# Patient Record
Sex: FEMALE | Race: ASIAN | NOT HISPANIC OR LATINO | Employment: UNEMPLOYED | URBAN - NONMETROPOLITAN AREA
[De-identification: names, ages, dates, MRNs, and addresses within clinical notes are randomized per-mention and may not be internally consistent; named-entity substitution may affect disease eponyms.]

---

## 2017-10-16 ENCOUNTER — TRANSCRIBE ORDERS (OUTPATIENT)
Dept: PHYSICAL THERAPY | Facility: HOSPITAL | Age: 72
End: 2017-10-16

## 2017-10-16 ENCOUNTER — HOSPITAL ENCOUNTER (OUTPATIENT)
Dept: PHYSICAL THERAPY | Facility: HOSPITAL | Age: 72
Setting detail: THERAPIES SERIES
Discharge: HOME | End: 2017-10-16

## 2017-10-16 DIAGNOSIS — M25.511 CHRONIC RIGHT SHOULDER PAIN: Primary | ICD-10-CM

## 2017-10-16 DIAGNOSIS — M25.511 RIGHT SHOULDER PAIN, UNSPECIFIED CHRONICITY: Primary | ICD-10-CM

## 2017-10-16 DIAGNOSIS — G89.29 CHRONIC RIGHT SHOULDER PAIN: Primary | ICD-10-CM

## 2017-10-16 PROCEDURE — 97161 PT EVAL LOW COMPLEX 20 MIN: CPT | Performed by: PHYSICAL THERAPIST

## 2017-10-16 PROCEDURE — G8985 CARRY GOAL STATUS: HCPCS | Performed by: PHYSICAL THERAPIST

## 2017-10-16 PROCEDURE — G8984 CARRY CURRENT STATUS: HCPCS | Performed by: PHYSICAL THERAPIST

## 2017-10-16 PROCEDURE — 97110 THERAPEUTIC EXERCISES: CPT | Performed by: PHYSICAL THERAPIST

## 2017-10-16 NOTE — THERAPY EVALUATION
Outpatient Physical Therapy Ortho Initial Evaluation  [facility]     Patient Name: Neal Roblero  : 1945  MRN: [id number]  Today's Date: 10/16/2017      Visit Date: 10/16/2017  Visit   Return to MD: CHRISTIAN  Re-cert date: 17    There is no problem list on file for this patient.       History reviewed. No pertinent past medical history.     History reviewed. No pertinent surgical history.    Visit Dx:     ICD-10-CM ICD-9-CM   1. Chronic right shoulder pain M25.511 719.41    G89.29 338.29                 PT Ortho       10/16/17 1000    Subjective Comments    Subjective Comments 70 yo female with 2 yr h/o chronic right shoulder pain, had been involved in much gardening at the time. Non-progressive R shoulder pain at this time.    -BS    Precautions and Contraindications    Precautions none  -BS    Subjective Pain    Able to rate subjective pain? yes  -BS    Pre-Treatment Pain Level 5  -BS    Post-Treatment Pain Level 2  -BS    Special Tests/Palpation    Special Tests/Palpation Shoulder  -BS    Shoulder Impingement/Rotator Cuff Special Tests    Espitia-Erick Test (RC Lesion vs. Bursitis) Right:;Positive  -BS    Empty Can Test (RC Lesion) Right:;Negative  -BS    Speed's Test (LH of Biceps Lesion) Right:;Negative  -BS    Biceps/Labral Special Tests    Geneva's Test (Labral Test) Right:;Negative  -BS    ROM (Range of Motion)    General ROM Detail AROM: R shoulder: flex 160° abd 130° ER 65° IR 70° L shoulder-flex/abd 170° ER 90° IR WNL  -BS    MMT (Manual Muscle Testing)    General MMT Assessment Detail R shoulder 3+/5 (all planes); L shoulder 4/5 (all planes)  -BS      User Key  (r) = Recorded By, (t) = Taken By, (c) = Cosigned By    Initials Name Provider Type    CARMEN Phillips, PT Physical Therapist                            Therapy Education       10/16/17 1200          Therapy Education    Given HEP;Symptoms/condition management;Pain management;Posture/body mechanics  -BS         User Key  (r) = Recorded By, (t) = Taken By, (c) = Cosigned By    Initials Name Provider Type    BS Brad Phillips, PT Physical Therapist                PT OP Goals       10/16/17 1200 10/16/17 1000    PT Short Term Goals    STG Date to Achieve 10/30/17  -BS     STG 1 Pt I with HEP  -BS     STG 1 Progress New  -BS     STG 2 Improve right shoulder MMT (all planes) to 4-/5  -BS     STG 2 Progress New  -BS     STG 3 Improve R shoulder abduction AROM to 150°  -BS     STG 3 Progress New  -BS     STG 4 Improve R shoulder IR (hand behind back) to T8 level  -BS     STG 4 Progress New  -BS     Long Term Goals    LTG Date to Achieve 11/13/17  -BS     LTG 1 Improve right shoulder MMT (all planes) to > or = 4/5  -BS     LTG 1 Progress New  -BS     LTG 2 Improve R shoulder IR (hand behind back) to T6 level  -BS     LTG 2 Progress New  -BS     LTG 3 Able to perform yardwork without residual R shoulder pain  -BS     LTG 3 Progress New  -BS     LTG 4 Able to perform ADL's using R UE (showering) without difficulty  -BS     LTG 4 Progress New  -BS     Time Calculation    PT Goal Re-Cert Due Date --  -BS 11/06/17  -BS      User Key  (r) = Recorded By, (t) = Taken By, (c) = Cosigned By    Initials Name Provider Type    CARMEN Phillips, PT Physical Therapist                PT Assessment/Plan       10/16/17 1200       PT Assessment    Functional Limitations Performance in self-care ADL;Performance in work activities;Limitation in home management  -BS     Impairments Range of motion;Pain;Muscle strength  -BS     Assessment Comments chronic right shoulder pain due to suspected impingement.   -BS     Please refer to paper survey for additional self-reported information Yes  -BS     Rehab Potential Good  -BS     Patient/caregiver participated in establishment of treatment plan and goals Yes  -BS     Patient would benefit from skilled therapy intervention Yes  -BS     PT Plan    PT Frequency 2x/week  -BS     Predicted Duration of Therapy  Intervention (days/wks) 4 weeks  -BS     Planned CPT's? PT EVAL LOW COMPLEXITY: 04142;PT RE-EVAL: 86281;PT THER PROC EA 15 MIN: 10666;PT MANUAL THERAPY EA 15 MIN: 31047;PT HOT OR COLD PACK TREAT [facility]ARE;PT ELECTRICAL STIM UNATTEND: ;PT ULTRASOUND EA 15 MIN: 56366;PT THER SUPP EA 15 MIN  -BS     Physical Therapy Interventions (Optional Details) home exercise program;joint mobilization;manual therapy techniques;modalities;patient/family education;postural re-education;strengthening;ROM (Range of Motion);stretching  -BS     PT Plan Comments f/u with rotator cuff strengthening  -BS       User Key  (r) = Recorded By, (t) = Taken By, (c) = Cosigned By    Initials Name Provider Type    CARMEN Phillips, PT Physical Therapist                  Exercises       10/16/17 1000          Subjective Comments    Subjective Comments 70 yo female with 2 yr h/o chronic right shoulder pain, had been involved in much gardening at the time. Non-progressive R shoulder pain at this time.    -BS      Subjective Pain    Able to rate subjective pain? yes  -BS      Pre-Treatment Pain Level 5  -BS      Post-Treatment Pain Level 2  -BS      Exercise 1    Exercise Name 1 shoulder ext stretch w/ dowel  -BS      Sets 1 2  -BS      Reps 1 10  -BS      Exercise 2    Exercise Name 2 shoulder IR stretch in sidelying  -BS      Sets 2 1  -BS      Reps 2 10  -BS      Exercise 3    Exercise Name 3 shoulder IR stretch at wall  -BS      Exercise 4    Exercise Name 4 posterior capsule stretch  -BS      Sets 4 1  -BS      Reps 4 20  -BS      Exercise 5    Exercise Name 5 standing B shoulder abd AROM  -BS      Sets 5 1  -BS      Reps 5 10  -BS      Exercise 6    Exercise Name 6 standing R shoulder flex AROM  -BS      Sets 6 1  -BS      Reps 6 10  -BS        User Key  (r) = Recorded By, (t) = Taken By, (c) = Cosigned By    Initials Name Provider Type    CARMEN Phillips, PT Physical Therapist                              Outcome Measures       10/16/17 1200           Quick DASH    Open a tight or new jar. 3  -BS      Do heavy household chores (e.g., wash walls, wash floors) 4  -BS      Carry a shopping bag or briefcase 3  -BS      Wash your back 3  -BS      Use a knife to cut food 1  -BS      Recreational activities in which you take some force or impact through your arm, should or hand (e.g. golf, hammering, tennis, etc.) 4  -BS      During the past week, to what extent has your arm, shoulder, or hand problem interfered with your normal social activites with family, friends, neighbors or groups? 5  -BS      During the past week, were you limited in your work or other regular daily activities as a result of your arm, shoulder or hand problem? 3  -BS      Arm, Shoulder, or hand pain 5  -BS      Tingling (pins and needles) in your arm, shoulder, or hand 1  -BS      During the past week, how much difficulty have you had sleeping because of the pain in your arm, shoulder or hand? 2  -BS      Number of Questions Answered 11  -BS      Quick DASH Score 52.27  -BS      Functional Assessment    Outcome Measure Options Quick DASH  -BS        User Key  (r) = Recorded By, (t) = Taken By, (c) = Cosigned By    Initials Name Provider Type    BS Brad Phillips, PT Physical Therapist            Time Calculation:   Start Time: 1020  Stop Time: 1101  Time Calculation (min): 41 min     Therapy Charges for Today     Code Description Service Date Service Provider Modifiers Qty    25995858049 HC PT CARRY MOV HAND OBJ CURRENT 10/16/2017 Brad Phillips, PT GP, CK 1    04147758766 HC PT CARRY MOV HAND OBJ PROJECTED 10/16/2017 Brad Phillips PT GP, CJ 1    [id number]  PT EVAL LOW COMPLEXITY 2 10/16/2017 Brad Phillips PT GP 1    [id number]  PT THER PROC EA 15 MIN 10/16/2017 Brad Phillips, PT GP 1          PT G-Codes  Outcome Measure Options: Quick DASH  Score: 52%  Functional Limitation: Carrying, moving and handling objects  Carrying, Moving and Handling Objects Current Status (): At least  40 percent but less than 60 percent impaired, limited or restricted  Carrying, Moving and Handling Objects Goal Status (): At least 20 percent but less than 40 percent impaired, limited or restricted         Brad Phillips, PT  10/16/2017

## 2017-10-23 ENCOUNTER — HOSPITAL ENCOUNTER (OUTPATIENT)
Dept: PHYSICAL THERAPY | Facility: HOSPITAL | Age: 72
Setting detail: THERAPIES SERIES
Discharge: HOME | End: 2017-10-23

## 2017-10-23 DIAGNOSIS — G89.29 CHRONIC RIGHT SHOULDER PAIN: Primary | ICD-10-CM

## 2017-10-23 DIAGNOSIS — M25.511 CHRONIC RIGHT SHOULDER PAIN: Primary | ICD-10-CM

## 2017-10-23 PROCEDURE — 97110 THERAPEUTIC EXERCISES: CPT | Performed by: PHYSICAL THERAPIST

## 2017-10-23 NOTE — THERAPY TREATMENT NOTE
"    Outpatient Physical Therapy Ortho Treatment Note  [facility]  Haley Patel, PT, DPT, CSCS       Patient Name: Neal Roblero  : 1945  MRN: [id number]  Today's Date: 10/23/2017      Visit Date: 10/23/2017     Pt reports 5/10 pain pre treatment, /10 pain post treatment  Reports -% of improvement.  Attended 2/2 visits.  Insurance available: Medical necessity and medicare cap  Next MD appt: CHRISTIAN .  Recertification: 2017.    Visit Dx:    ICD-10-CM ICD-9-CM   1. Chronic right shoulder pain M25.511 719.41    G89.29 338.29        History reviewed. No pertinent past medical history.     History reviewed. No pertinent surgical history.          PT Ortho       10/23/17 1300    Precautions and Contraindications    Precautions none  -AJ    Subjective Pain    Able to rate subjective pain? yes  -AJ    Post-Treatment Pain Level 0   \"It feels okay now\"  -AJ    Posture/Observations    Posture/Observations Comments No acute distress.  -AJ      User Key  (r) = Recorded By, (t) = Taken By, (c) = Cosigned By    Initials Name Provider Type    RAISSA Patel PT Physical Therapist                            PT Assessment/Plan       10/23/17 1300       PT Assessment    Assessment Comments Required a lot of  verbal and tactile cueing for all exercises. Issued HEP.  -AJ     PT Plan    PT Frequency 2x/week  -     PT Plan Comments Review HEP  -AJ       User Key  (r) = Recorded By, (t) = Taken By, (c) = Cosigned By    Initials Name Provider Type    RAISSA Patel PT Physical Therapist                Modalities       10/23/17 1300          Ice    Ice Applied Yes  -AJ      Location R shoulder  -AJ      Rx Minutes 15 mins  -      Ice S/P Rx Yes  -AJ        User Key  (r) = Recorded By, (t) = Taken By, (c) = Cosigned By    Initials Name Provider Type    RAISSA Patel PT Physical Therapist                Exercises       10/23/17 1300          Subjective Comments    " "Subjective Comments Patient reports the shoulder is still bothinering her because she is still working.  -AJ      Subjective Pain    Able to rate subjective pain? yes  -AJ      Pre-Treatment Pain Level 4  -AJ      Post-Treatment Pain Level 0   \"It feels okay now\"  -AJ      Exercise 1    Exercise Name 1 Pro II UE F/R  -AJ      Time (Minutes) 1 10 minutes  -AJ      Additional Comments L 2.0  -AJ      Exercise 2    Exercise Name 2 Pulley's 3-way  -AJ      Time (Minutes) 2 2 min each  -AJ      Exercise 3    Exercise Name 3 Supine Wand flexion  -AJ      Cueing 3 Tactile  -AJ      Reps 3 20  -AJ      Additional Comments Wants to deviate to L when going into flexion  -AJ      Exercise 4    Exercise Name 4 Supine wand IR/ER  -AJ      Reps 4 20  -AJ      Exercise 5    Exercise Name 5 Scap Squeezes  -AJ      Reps 5 20  -AJ      Time (Seconds) 5 5\" hold  -AJ      Exercise 6    Exercise Name 6 Wall slides: flexion  -AJ      Reps 6 20  -AJ      Exercise 7    Exercise Name 7 Wall slides: abduction  -AJ      Reps 7 20  -AJ      Exercise 8    Exercise Name 8 Post Shld rolls between exercises  -AJ      Reps 8 10  -AJ        User Key  (r) = Recorded By, (t) = Taken By, (c) = Cosigned By    Initials Name Provider Type    RAISSA Patel, PT Physical Therapist                               PT OP Goals       10/23/17 1300       PT Short Term Goals    STG Date to Achieve 10/30/17  -AJ     STG 1 Pt I with HEP  -AJ     STG 1 Progress Ongoing  -AJ     STG 2 Improve right shoulder MMT (all planes) to 4-/5  -AJ     STG 2 Progress Ongoing  -     STG 3 Improve R shoulder abduction AROM to 150°  -AJ     STG 3 Progress Ongoing  -     STG 4 Improve R shoulder IR (hand behind back) to T8 level  -     STG 4 Progress Ongoing  -     Long Term Goals    LTG Date to Achieve 11/13/17  -     LTG 1 Improve right shoulder MMT (all planes) to > or = 4/5  -AJ     LTG 1 Progress Ongoing  -     LTG 2 Improve R shoulder IR (hand behind back) " to T6 level  -AJ     LTG 2 Progress Ongoing  -AJ     LTG 3 Able to perform yardwork without residual R shoulder pain  -AJ     LTG 3 Progress Ongoing  -RAISSA     LTG 4 Able to perform ADL's using R UE (showering) without difficulty  -     LTG 4 Progress Ongoing  -AJ     Time Calculation    PT Goal Re-Cert Due Date 11/06/17  -       User Key  (r) = Recorded By, (t) = Taken By, (c) = Cosigned By    Initials Name Provider Type    RAISSA Patel PT Physical Therapist                Therapy Education       10/23/17 1300          Therapy Education    Education Details HEP: Supine wand flex, IR/ER, scap squeezes  -RAISSA      Given HEP;Symptoms/condition management;Posture/body mechanics;Pain management  -RAISSA      Program New  -RAISSA      How Provided Verbal;Demonstration;Written  -RAISSA      Provided to Patient  -AJ      Level of Understanding Verbalized;Demonstrated  -        User Key  (r) = Recorded By, (t) = Taken By, (c) = Cosigned By    Initials Name Provider Type    RAISSA Patel PT Physical Therapist                Time Calculation:   Start Time: 1300  Stop Time: 1355  Time Calculation (min): 55 min  PT Non-Billable Time (min): 15 min  Total Timed Code Minutes- PT: 40 minute(s)    Therapy Charges for Today     Code Description Service Date Service Provider Modifiers Qty    42005347852  PT THER PROC EA 15 MIN 10/23/2017 Haley Patel PT GP 3    [id number]  PT THER SUPP EA 15 MIN 10/23/2017 Haley Patel PT GP 1                    Haley Patel PT, DPT, CSCS  10/23/2017

## 2017-10-25 ENCOUNTER — HOSPITAL ENCOUNTER (OUTPATIENT)
Dept: PHYSICAL THERAPY | Facility: HOSPITAL | Age: 72
Setting detail: THERAPIES SERIES
Discharge: HOME | End: 2017-10-25

## 2017-10-25 DIAGNOSIS — G89.29 CHRONIC RIGHT SHOULDER PAIN: Primary | ICD-10-CM

## 2017-10-25 DIAGNOSIS — M25.511 CHRONIC RIGHT SHOULDER PAIN: Primary | ICD-10-CM

## 2017-10-25 PROCEDURE — 97110 THERAPEUTIC EXERCISES: CPT

## 2017-10-25 NOTE — THERAPY TREATMENT NOTE
"    Outpatient Physical Therapy Ortho Treatment Note  [facility]     Patient Name: Neal Roblero  : 1945  MRN: [id number]  Today's Date: 10/25/2017      Visit Date: 10/25/2017  Pt reports 2/10 pain pre treatment, Numb /10 pain post treatment  Reports \" feels better\" improvement.  Attended 3/3 visits.  Insurance available:Medicare   Next MD appt: .  Recertification: 2017.  Visit Dx:    ICD-10-CM ICD-9-CM   1. Chronic right shoulder pain M25.511 719.41    G89.29 338.29       There is no problem list on file for this patient.       No past medical history on file.     No past surgical history on file.          PT Ortho       10/25/17 1300    Subjective Pain    Able to rate subjective pain? yes  -TL    Pre-Treatment Pain Level 2  -TL      10/23/17 1300    Precautions and Contraindications    Precautions none  -AJ    Subjective Pain    Able to rate subjective pain? yes  -AJ    Post-Treatment Pain Level 0   \"It feels okay now\"  -AJ    Posture/Observations    Posture/Observations Comments No acute distress.  -AJ      User Key  (r) = Recorded By, (t) = Taken By, (c) = Cosigned By    Initials Name Provider Type    RAISSA Patel, PT Physical Therapist    JENNA Mcgill PTA Physical Therapy Assistant                            PT Assessment/Plan       10/25/17 1300       PT Assessment    Assessment Comments pt continues to need alot of cues for proper technigue with HEP. Pt tolerated new ex of no money this date. PTA given pt same HEP with written instructions and pictures. Pt had been given HEP with sheet but has not been doing HEP. Pt not for sure where the Sheets are located.  -TL     PT Plan    PT Frequency 2x/week  -TL     PT Plan Comments Continue strengthening and follow up with HEP  -TL       User Key  (r) = Recorded By, (t) = Taken By, (c) = Cosigned By    Initials Name Provider Type    JENNA Mcgill PTA Physical Therapy Assistant              "   Modalities       10/25/17 1300          Ice    Ice Applied Yes  -TL      Location R shoulder  -TL      Rx Minutes 15 mins  -TL      Ice S/P Rx Yes  -TL        User Key  (r) = Recorded By, (t) = Taken By, (c) = Cosigned By    Initials Name Provider Type    TL Asmita Mcgill PTA Physical Therapy Assistant                Exercises       10/25/17 1300          Subjective Pain    Able to rate subjective pain? yes  -TL      Pre-Treatment Pain Level 2  -TL      Exercise 1    Exercise Name 1 Pro II UE F/R  -TL      Time (Minutes) 1 10 minutes  -TL      Additional Comments level 2  -TL      Exercise 2    Exercise Name 2 Pulley's 3-way  -TL      Cueing 2 Tactile  -TL      Time (Minutes) 2 3mins  -TL      Exercise 3    Exercise Name 3 Wall slides flexion  -TL      Cueing 3 Tactile  -TL      Reps 3 20  -TL      Additional Comments continues to want to lean  -TL      Exercise 4    Exercise Name 4 Wall slides abd  -TL      Cueing 4 Tactile  -TL      Reps 4 20  -TL      Exercise 5    Exercise Name 5 supine wand flexion  -TL      Cueing 5 Verbal  -TL      Reps 5 20  -TL      Exercise 6    Exercise Name 6 supine wand IR/ER  -TL      Cueing 6 Tactile  -TL      Reps 6 20  -TL      Exercise 7    Exercise Name 7 shld scap squeezes  -TL      Reps 7 20  -TL      Time (Seconds) 7 5 sec hold  -TL      Exercise 8    Exercise Name 8 Post Shld rolls between exercises  -TL      Reps 8 10  -TL      Exercise 9    Exercise Name 9 No money ex  -TL      Reps 9 10  -TL      Time (Seconds) 9 5 sec hold  -TL        User Key  (r) = Recorded By, (t) = Taken By, (c) = Cosigned By    Initials Name Provider Type    TL Asmita Mcgill PTA Physical Therapy Assistant                               PT OP Goals       10/25/17 1300       PT Short Term Goals    STG Date to Achieve 10/30/17  -TL     STG 1 Pt I with HEP  -TL     STG 1 Progress Ongoing  -TL     STG 1 Progress Comments pt needs verbal and tactile cues  -TL     STG 2 Improve right shoulder MMT (all  planes) to 4-/5  -TL     STG 2 Progress Progressing  -TL     STG 3 Improve R shoulder abduction AROM to 150°  -TL     STG 3 Progress Progressing  -TL     STG 4 Improve R shoulder IR (hand behind back) to T8 level  -TL     STG 4 Progress Ongoing  -TL     Long Term Goals    LTG Date to Achieve 11/13/17  -TL     LTG 1 Improve right shoulder MMT (all planes) to > or = 4/5  -TL     LTG 1 Progress Ongoing  -TL     LTG 2 Improve R shoulder IR (hand behind back) to T6 level  -TL     LTG 2 Progress Ongoing  -TL     LTG 3 Able to perform yardwork without residual R shoulder pain  -TL     LTG 3 Progress Ongoing  -TL     LTG 4 Able to perform ADL's using R UE (showering) without difficulty  -TL     LTG 4 Progress Ongoing  -TL     Time Calculation    PT Goal Re-Cert Due Date 11/06/17  -TL       User Key  (r) = Recorded By, (t) = Taken By, (c) = Cosigned By    Initials Name Provider Type    JENNA Mcgill PTA Physical Therapy Assistant                Therapy Education       10/25/17 1300          Therapy Education    Given HEP  -TL      Program Reinforced  -TL      How Provided Verbal;Demonstration;Written  -TL      Provided to Patient  -TL      Level of Understanding Verbalized;Demonstrated  -TL        User Key  (r) = Recorded By, (t) = Taken By, (c) = Cosigned By    Initials Name Provider Type    JENNA Mcgill PTA Physical Therapy Assistant                Time Calculation:   Start Time: 1300  Stop Time: 1406  Time Calculation (min): 66 min  PT Non-Billable Time (min): 15 min  Total Timed Code Minutes- PT: 51 minute(s)    Therapy Charges for Today     Code Description Service Date Service Provider Modifiers Qty    87242405419 HC PT THER PROC EA 15 MIN 10/25/2017 Asmita Mcgill PTA GP 3                    Asmita Mcgill PTA  10/25/2017

## 2017-10-30 ENCOUNTER — APPOINTMENT (OUTPATIENT)
Dept: PHYSICAL THERAPY | Facility: HOSPITAL | Age: 72
End: 2017-10-30

## 2017-11-01 ENCOUNTER — HOSPITAL ENCOUNTER (OUTPATIENT)
Dept: PHYSICAL THERAPY | Facility: HOSPITAL | Age: 72
Setting detail: THERAPIES SERIES
Discharge: HOME | End: 2017-11-01

## 2017-11-01 DIAGNOSIS — G89.29 CHRONIC RIGHT SHOULDER PAIN: Primary | ICD-10-CM

## 2017-11-01 DIAGNOSIS — M25.511 CHRONIC RIGHT SHOULDER PAIN: Primary | ICD-10-CM

## 2017-11-01 PROCEDURE — 97110 THERAPEUTIC EXERCISES: CPT

## 2017-11-01 NOTE — THERAPY TREATMENT NOTE
Outpatient Physical Therapy Ortho Treatment Note  [facility]  Consuelo Key PTA       Patient Name: Neal Roblero  : 1945  MRN: [id number]  Today's Date: 2017      Visit Date: 2017     Visits: 4/5  Insurance Visits Approved: based on medical necessity and medicare cap  Recert Due: 2017  MD Appt: 2018  Pain: pretreatment 410; post treatment 10  Improvement: pt is subjectively reporting 0% improvement since initial evaluation    Visit Dx:    ICD-10-CM ICD-9-CM   1. Chronic right shoulder pain M25.511 719.41    G89.29 338.29       There is no problem list on file for this patient.       Medication List:  Prednisone 5mg  Leflunomide 10mg  CelecoxiB 200mg  Lisinopril 20mg  Amlodipine Besylate 5mg  Centrum Silver  Iron  Vitamin D 400  Calcium  Pepto Bismol PRN  Dayquil PRN    Past Medical History:   Diagnosis Date   • Arthritis     rheumatoid arthritis   • Hypertension    • Pre-diabetes         Past Surgical History:   Procedure Laterality Date   • TONSILLECTOMY  1975             PT Ortho       17 1300    Subjective Comments    Subjective Comments states that she has a little bit of pain  -    Precautions and Contraindications    Precautions LATEX ALLERGY  -    Subjective Pain    Able to rate subjective pain? yes  -    Pre-Treatment Pain Level 4  -    Posture/Observations    Posture/Observations Comments No acute distress.  -      User Key  (r) = Recorded By, (t) = Taken By, (c) = Cosigned By    Initials Name Provider Type     Consuelo Key PTA Physical Therapy Assistant                            PT Assessment/Plan       17 1300       PT Assessment    Assessment Comments cues for appropriate technique with IR S with strap. reports decreadsed discomfort with use of pullies during treatment  -     PT Plan    PT Frequency 2x/week  -       User Key  (r) = Recorded By, (t) = Taken By, (c) = Cosigned By    Initials Name Provider Type      Consuelo Key PTA Physical Therapy Assistant                Modalities       11/01/17 1300          Ice    Ice Applied Yes  -MH      Location R Shoulder  -MH      Rx Minutes 15 mins  -MH      Ice S/P Rx Yes  -MH        User Key  (r) = Recorded By, (t) = Taken By, (c) = Cosigned By    Initials Name Provider Type     Consuelo Key PTA Physical Therapy Assistant                Exercises       11/01/17 1300          Subjective Comments    Subjective Comments states that she has a little bit of pain  -      Subjective Pain    Able to rate subjective pain? yes  -MH      Pre-Treatment Pain Level 4  -MH      Exercise 1    Exercise Name 1 Pro II UE F/R L 4.0  -MH      Time (Minutes) 1 5 minutes  -MH      Additional Comments stopped after 5 minutes secondary to needing to review medical history with patient  -MH      Exercise 2    Exercise Name 2 Pulley's 3-way  -MH      Time (Minutes) 2 3 minutes  -MH      Exercise 3    Exercise Name 3 tband IR S with strap  -MH      Reps 3 3  -MH      Time (Minutes) 3 1 minute  -MH      Exercise 4    Exercise Name 4 wall circles CW/CCW  -MH      Reps 4 20  -MH      Exercise 5    Exercise Name 5 wall slides flexion  -MH      Reps 5 20  -MH      Exercise 6    Exercise Name 6 wall slides ABD  -MH      Reps 6 20  -MH      Exercise 7    Exercise Name 7 Tband Rows Mid, low  -MH      Reps 7 20  -MH      Additional Comments latex free Red band  -MH      Exercise 8    Exercise Name 8 Tband IR/ER  -MH      Reps 8 20  -MH      Additional Comments latex free Red  -MH        User Key  (r) = Recorded By, (t) = Taken By, (c) = Cosigned By    Initials Name Provider Type     Consuelo Key PTA Physical Therapy Assistant                               PT OP Goals       11/01/17 1300       PT Short Term Goals    STG Date to Achieve 10/30/17  -MH     STG 1 Pt I with HEP  -MH     STG 1 Progress Ongoing  -MH     STG 2 Improve right shoulder MMT (all planes) to 4-/5  -MH     STG 2 Progress Progressing  -MH      STG 3 Improve R shoulder abduction AROM to 150°  -     STG 3 Progress Progressing  -     STG 4 Improve R shoulder IR (hand behind back) to T8 level  -     STG 4 Progress Ongoing  -     Long Term Goals    LTG Date to Achieve 11/13/17  -     LTG 1 Improve right shoulder MMT (all planes) to > or = 4/5  -     LTG 1 Progress Ongoing  [facility]     LTG 2 Improve R shoulder IR (hand behind back) to T6 level  -     LTG 2 Progress Ongoing  [facility]     LTG 3 Able to perform yardwork without residual R shoulder pain  -     LTG 3 Progress Ongoing  -     LTG 4 Able to perform ADL's using R UE (showering) without difficulty  -     LTG 4 Progress Ongoing  [facility]     Time Calculation    PT Goal Re-Cert Due Date 11/06/17  -       User Key  (r) = Recorded By, (t) = Taken By, (c) = Cosigned By    Initials Name Provider Type     Consuelo Key PTA Physical Therapy Assistant                Therapy Education       11/01/17 1300          Therapy Education    Education Details latex free tband rows  -      Given HEP;Symptoms/condition management;Pain management;Posture/body mechanics  -      Program Progressed  -      How Provided Verbal;Demonstration;Written  -      Provided to Patient  -      Level of Understanding Verbalized;Teach back education performed;Demonstrated  -        User Key  (r) = Recorded By, (t) = Taken By, (c) = Cosigned By    Initials Name Provider Type     Consuelo Key PTA Physical Therapy Assistant                Time Calculation:   Start Time: 1300  Stop Time: 1405  Time Calculation (min): 65 min  PT Non-Billable Time (min): 15 min    Therapy Charges for Today     Code Description Service Date Service Provider Modifiers Qty     DE DME SUPPLY OR ACCESSORY, NOS 11/1/2017 Consuelo Key PTA  1    71110768910 HC PT THER PROC EA 15 MIN 11/1/2017 Consuelo Key PTA GP 3    86625652261 HC PT THER SUPP EA 15 MIN 11/1/2017 Consuelo Key PTA GP 1     DE DME SUPPLY OR ACCESSORY, NOS  11/1/2017 Consuelo Key, PTA  1                    Consuelo Key, PTA  11/1/2017

## 2017-11-07 ENCOUNTER — HOSPITAL ENCOUNTER (OUTPATIENT)
Dept: PHYSICAL THERAPY | Facility: HOSPITAL | Age: 72
Setting detail: THERAPIES SERIES
Discharge: HOME | End: 2017-11-07

## 2017-11-07 DIAGNOSIS — M25.511 CHRONIC RIGHT SHOULDER PAIN: Primary | ICD-10-CM

## 2017-11-07 DIAGNOSIS — G89.29 CHRONIC RIGHT SHOULDER PAIN: Primary | ICD-10-CM

## 2017-11-07 PROCEDURE — 97110 THERAPEUTIC EXERCISES: CPT | Performed by: PHYSICAL THERAPIST

## 2017-11-07 PROCEDURE — G8985 CARRY GOAL STATUS: HCPCS | Performed by: PHYSICAL THERAPIST

## 2017-11-07 PROCEDURE — G8984 CARRY CURRENT STATUS: HCPCS | Performed by: PHYSICAL THERAPIST

## 2017-11-08 NOTE — THERAPY PROGRESS REPORT/RE-CERT
Outpatient Physical Therapy Ortho Progress Note  [facility]     Patient Name: Neal Roblero  : 1945  MRN: [id number]  Today's Date: 2017      Visit Date: 2017  Visits: 5/6  Insurance Visits Approved: based on medical necessity and medicare cap  Recert Due: 2017  MD Appt: 2018  Pain: pretreatment 3/10; post treatment 3/10  Improvement: pt is subjectively reporting 0% improvement since initial evaluation    There is no problem list on file for this patient.       Past Medical History:   Diagnosis Date   • Arthritis     rheumatoid arthritis   • Hypertension    • Pre-diabetes         Past Surgical History:   Procedure Laterality Date   • TONSILLECTOMY         Visit Dx:     ICD-10-CM ICD-9-CM   1. Chronic right shoulder pain M25.511 719.41    G89.29 338.29                 PT Ortho       17 1300    Subjective Comments    Subjective Comments pt reported severe soreness and pain in the right shoulder this morning, alleviated by pain meds  -BS    Precautions and Contraindications    Precautions LATEX ALLERGY  -BS    Subjective Pain    Able to rate subjective pain? yes  -BS    Pre-Treatment Pain Level 3  -BS    ROM (Range of Motion)    General ROM Detail R shoulder IR-hand behind back to T12, flex 164° abd 150° ER 84° IR 84°   -BS    MMT (Manual Muscle Testing)    General MMT Assessment Detail R shoulder-flex 4/5 abd 4+/5 IR 4/4 ER 4/5  -BS      User Key  (r) = Recorded By, (t) = Taken By, (c) = Cosigned By    Initials Name Provider Type    CARMEN Phillips PT Physical Therapist                            Therapy Education       17 1800          Therapy Education    Given HEP;Symptoms/condition management;Pain management;Posture/body mechanics  -BS      Program Progressed  -BS      How Provided Verbal;Demonstration;Written  -BS      Provided to Patient  -BS      Level of Understanding Verbalized;Teach back education performed;Demonstrated  -BS        User  Key  (r) = Recorded By, (t) = Taken By, (c) = Cosigned By    Initials Name Provider Type    CARMEN Phillips, PT Physical Therapist                PT OP Goals       11/07/17 1300       PT Short Term Goals    STG Date to Achieve 11/21/17  -BS     STG 1 Pt I with HEP  -BS     STG 1 Progress Ongoing  -BS     STG 2 Improve right shoulder MMT (all planes) to 4-/5  -BS     STG 2 Progress Met  -BS     STG 3 Improve R shoulder abduction AROM to 150°  -BS     STG 3 Progress Met  -BS     STG 4 Improve R shoulder IR (hand behind back) to T8 level  -BS     STG 4 Progress Progressing  -BS     Long Term Goals    LTG Date to Achieve 12/05/17  -BS     LTG 1 Improve right shoulder MMT (all planes) to > or = 4+/5  -BS     LTG 1 Progress Met;Goal Revised  -BS     LTG 2 Improve R shoulder IR (hand behind back) to T6 level  -BS     LTG 2 Progress Ongoing  -BS     LTG 3 Able to perform yardwork without residual R shoulder pain  -BS     LTG 3 Progress Ongoing  -BS     LTG 4 Able to perform ADL's using R UE (showering) without difficulty  -BS     LTG 4 Progress Ongoing  -BS     Time Calculation    PT Goal Re-Cert Due Date 11/28/17  -BS       User Key  (r) = Recorded By, (t) = Taken By, (c) = Cosigned By    Initials Name Provider Type    CARMEN Phillips, PT Physical Therapist                PT Assessment/Plan       11/07/17 1800       PT Assessment    Assessment Comments pt progressing toward goals, limited by RTC weakness, improved R shoulder AROM in all planes.   -BS     Please refer to paper survey for additional self-reported information Yes  -BS     Rehab Potential Good  -BS     Patient/caregiver participated in establishment of treatment plan and goals Yes  -BS     Patient would benefit from skilled therapy intervention Yes  -BS     PT Plan    PT Frequency 2x/week  -BS     Predicted Duration of Therapy Intervention (days/wks) 3 weeks  -BS     Planned CPT's? PT RE-EVAL: 22207;PT THER PROC EA 15 MIN: 61093;PT MANUAL THERAPY EA 15 MIN:  61296;PT HOT OR COLD PACK TREAT MCARE;PT ELECTRICAL STIM UNATTEND: ;PT ULTRASOUND EA 15 MIN: 63607;PT THER SUPP EA 15 MIN  -BS     Physical Therapy Interventions (Optional Details) home exercise program;joint mobilization;manual therapy techniques;modalities;patient/family education;postural re-education;ROM (Range of Motion);strengthening;stretching  -BS     PT Plan Comments continue RTC and scapular strengthening  -BS       User Key  (r) = Recorded By, (t) = Taken By, (c) = Cosigned By    Initials Name Provider Type    CARMEN Phillips, PT Physical Therapist                  Exercises       11/07/17 1300          Subjective Comments    Subjective Comments pt reported severe soreness and pain in the right shoulder this morning, alleviated by pain meds  -BS      Subjective Pain    Able to rate subjective pain? yes  -BS      Pre-Treatment Pain Level 3  -BS      Post-Treatment Pain Level 3  -BS      Exercise 1    Exercise Name 1 Pro II, level 1  -BS      Time (Minutes) 1 5 minutes  -BS      Exercise 2    Exercise Name 2 shoulder ext stretch w/ dowel  -BS      Sets 2 1  -BS      Reps 2 15  -BS      Exercise 3    Exercise Name 3 posterior capsule stretch  -BS      Sets 3 1  -BS      Reps 3 15  -BS      Exercise 4    Exercise Name 4 S/L shoulder IR stretch w/ self OP  -BS      Sets 4 1  -BS      Reps 4 15  -BS      Exercise 5    Exercise Name 5 wall push ups  -BS      Sets 5 1  -BS      Reps 5 15  -BS      Exercise 6    Exercise Name 6 shoulder IR stretch w/ towel  -BS      Sets 6 1  -BS      Reps 6 15  -BS        User Key  (r) = Recorded By, (t) = Taken By, (c) = Cosigned By    Initials Name Provider Type    CARMEN Phillips, PT Physical Therapist                              Outcome Measures       11/07/17 1800          Quick DASH    Open a tight or new jar. 4  -BS      Do heavy household chores (e.g., wash walls, wash floors) 4  -BS      Carry a shopping bag or briefcase 3  -BS      Wash your back 3  -BS      Use  a knife to cut food 3  -BS      Recreational activities in which you take some force or impact through your arm, should or hand (e.g. golf, hammering, tennis, etc.) 3  -BS      During the past week, to what extent has your arm, shoulder, or hand problem interfered with your normal social activites with family, friends, neighbors or groups? 4  -BS      During the past week, were you limited in your work or other regular daily activities as a result of your arm, shoulder or hand problem? 4  -BS      Arm, Shoulder, or hand pain 4  -BS      Tingling (pins and needles) in your arm, shoulder, or hand 4  -BS      During the past week, how much difficulty have you had sleeping because of the pain in your arm, shoulder or hand? 2  -BS      Number of Questions Answered 11  -BS      Quick DASH Score 61.36  -BS        User Key  (r) = Recorded By, (t) = Taken By, (c) = Cosigned By    Initials Name Provider Type    BS Brad Phillips, PT Physical Therapist            Time Calculation:   Start Time: 1307  Stop Time: 1403  Time Calculation (min): 56 min  PT Non-Billable Time (min): 15 min     Therapy Charges for Today     Code Description Service Date Service Provider Modifiers Qty    41845167842 HC PT CARRY MOV HAND OBJ CURRENT 11/7/2017 Brad Phillips, PT GP, CL 1    80157091062 HC PT CARRY MOV HAND OBJ PROJECTED 11/7/2017 Brad Phillips, PT GP, CJ 1    62341102367 HC PT THER PROC EA 15 MIN 11/7/2017 Brad Phillips PT GP 3    87290141590 HC PT THER SUPP EA 15 MIN 11/7/2017 Brad Phillips, PT GP 1          PT G-Codes  Outcome Measure Options: Quick DASH  Score: 61%  Functional Limitation: Carrying, moving and handling objects  Carrying, Moving and Handling Objects Current Status (): At least 60 percent but less than 80 percent impaired, limited or restricted  Carrying, Moving and Handling Objects Goal Status (): At least 20 percent but less than 40 percent impaired, limited or restricted         Brad Phillips PT  11/7/2017

## 2017-11-09 ENCOUNTER — APPOINTMENT (OUTPATIENT)
Dept: PHYSICAL THERAPY | Facility: HOSPITAL | Age: 72
End: 2017-11-09

## 2017-11-13 ENCOUNTER — TELEPHONE (OUTPATIENT)
Dept: PHYSICAL THERAPY | Facility: HOSPITAL | Age: 72
End: 2017-11-13

## 2017-11-16 ENCOUNTER — TELEPHONE (OUTPATIENT)
Dept: PHYSICAL THERAPY | Facility: HOSPITAL | Age: 72
End: 2017-11-16

## 2018-01-24 ENCOUNTER — DOCUMENTATION (OUTPATIENT)
Dept: PHYSICAL THERAPY | Facility: HOSPITAL | Age: 73
End: 2018-01-24

## 2020-11-09 ENCOUNTER — TRANSCRIBE ORDERS (OUTPATIENT)
Dept: PHYSICAL THERAPY | Facility: CLINIC | Age: 75
End: 2020-11-09

## 2020-11-09 ENCOUNTER — TREATMENT (OUTPATIENT)
Dept: PHYSICAL THERAPY | Facility: CLINIC | Age: 75
End: 2020-11-09

## 2020-11-09 DIAGNOSIS — M54.31 RIGHT SIDED SCIATICA: Primary | ICD-10-CM

## 2020-11-09 DIAGNOSIS — M54.41 LOW BACK PAIN WITH RIGHT-SIDED SCIATICA, UNSPECIFIED BACK PAIN LATERALITY, UNSPECIFIED CHRONICITY: ICD-10-CM

## 2020-11-09 PROCEDURE — 97110 THERAPEUTIC EXERCISES: CPT | Performed by: PHYSICAL THERAPIST

## 2020-11-09 PROCEDURE — 97161 PT EVAL LOW COMPLEX 20 MIN: CPT | Performed by: PHYSICAL THERAPIST

## 2020-11-09 NOTE — PROGRESS NOTES
"  Physical Therapy Initial Evaluation and Plan of Care      Patient: Neal Roblero   : 1945  Diagnosis/ICD-10 Code:  Right sided sciatica [M54.31]  Referring practitioner: Kassidy Prakash MD  Date of Initial Visit: 2020  Today's Date: 2020  Patient seen for 1 sessions    Next MD appt: 2021.  Recertification: 2020     Latex allergy  English as a second language         Subjective Questionnaire: Oswestry: 33/50 = 66%      Subjective Evaluation    History of Present Illness  Onset date: Spring 2020.    Subjective comment: Patient reports this started after weeding her yard and digging to get a plant up. She  reports when she takes the medicine the doctor gave her it was better. She reports she loves to do yard work and is in the yard a lot and it just makde it worse.  Patient Occupation: Unemployed Quality of life: good    Pain  Current pain ratin  At best pain ratin  At worst pain rating: 10  Location: R hip  Quality: dull ache, needle-like and radiating  Relieving factors: change in position  Aggravating factors: ambulation, prolonged positioning, stairs, standing and repetitive movement    Social Support  Lives in: one-story house (few into the home)  Lives with: spouse    Diagnostic Tests  X-ray: abnormal (\"Arthritis)    Treatments  Previous treatment: injection treatment and medication  Current treatment: medication  Patient Goals  Patient goals for therapy: decreased pain, independence with ADLs/IADLs and return to sport/leisure activities             Objective          Static Posture     Thoracic Spine  Flattened thoracic spine.    Lumbar Spine   Flattened.     Comments  Pelvis/Sacrum are level, no LLD to note.    Postural Observations  Seated posture: fair  Standing posture: good        Palpation     Right Tenderness of the piriformis.     Neurological Testing     Sensation     Lumbar   Left   Intact: light touch    Right   Intact: light touch  Paresthesia: light " touch    Active Range of Motion     Lumbar   Flexion: 110 (hands flat on the floor) degrees   Extension: 15 degrees   Left lateral flexion: Active left lumbar lateral flexion: 100% of range, WNL.   Right lateral flexion: Active right lumbar lateral flexion: 50% of range.   Left rotation: Active left lumbar rotation: 50% of range.   Right rotation: Active right lumbar rotation: 50% of range.     Strength/Myotome Testing     Lumbar   Left   Normal strength    Left Hip   Planes of Motion   Flexion: 5  Extension: 5  Abduction: 5  Adduction: 5    Right Hip   Planes of Motion   Flexion: 4+  Extension: 4+  Abduction: 5  Adduction: 5    Left Knee   Flexion: 5  Extension: 5    Right Knee   Flexion: 5  Extension: 5    Left Ankle/Foot   Dorsiflexion: 5  Plantar flexion: 5    Right Ankle/Foot   Dorsiflexion: 5  Plantar flexion: 5    Tests     Lumbar     Left   Negative crossed SLR, passive SLR, quadrant and valsalva.     Right   Positive quadrant.   Negative crossed SLR, passive SLR and valsalva.     Left Pelvic Girdle/Sacrum   Negative: sacrum compression, gapping and sacral spring.     Right Pelvic Girdle/Sacrum   Negative: sacrum compression, gapping and sacral spring.     Left Hip   Negative CHECO, FADIR, piriformis, SI compression and SI distraction.   SLR: Negative.     Right Hip   Positive piriformis.   Negative CHECO, FADIR, SI compression and SI distraction.   SLR: Negative.     Ambulation     Comments   FWB, non-antalgic gait, no distress, no assistive device, no significant gait deviation, normal arm swing with gait.          Assessment & Plan     Assessment  Impairments: abnormal or restricted ROM, activity intolerance, impaired physical strength, lacks appropriate home exercise program and pain with function  Assessment details: Patient presents with chronic R hip pain, sciatic type pain after doing a lot of flexion activities gardening and cleaning). She continues to have difficulties on/off since initial flare  "up. Excellent flexibility in B LEs. English is a 2nd language and sometimes things would have to be reworded, but no difficulties after that. Patient did well with all HEP exercises today. Patient was given written copies of HEP.  Prognosis: fair  Prognosis details: Barriers to Rehab: Include significant or possible arthritic/degenerative changes that have occurred within the joints/spine, The chronicity of this issue.    Safety Issues: Latex allergy; English as a second language  Functional Limitations: carrying objects, lifting, sleeping, uncomfortable because of pain, sitting, standing and unable to perform repetitive tasks  Goals  Plan Goals: Short Term Goals:  1) I with HEP and have additions/changes by next recertification    2) Patient able to show proper log roll technique.    3) Patient to be more aware of posture and posture correction techniques    4) AROM for lumbar extension >= 25°.     5) AROM B Lumbar SB WNL    6) AROM B Lumbar ROT >= 75% of range.      Long Term Goals:  1) Patient to have AROM for the lumbar spine all WNL, no increase in pain.    2) B LE 5/5.    3) Patient able to perform 20 Bridges with UE \"X\" over [place] with no increase in pain.    4) Patient able to show proper lifting technique floor to waist with no increase in pain.    5) Patient able to show proper ergonomics for mopping/sweeping/vacumming.    6) Patient to report radicular symptoms are now controlled with there ex when they occur.    7) I with final HEP.        Plan  Therapy options: will be seen for skilled physical therapy services  Planned modality interventions: cryotherapy, high voltage pulsed current (pain management) and thermotherapy (hydrocollator packs)  Planned therapy interventions: abdominal trunk stabilization, body mechanics training, flexibility, functional ROM exercises, home exercise program, transfer training, therapeutic activities, stretching, strengthening, spinal/joint mobilization, soft tissue " mobilization, postural training, manual therapy, neuromuscular re-education, ADL retraining and IADL retraining  Treatment plan discussed with: patient  Plan details: Progress ROM, strength, core stab, radicular centralization, neural extensibility, body mechanics to an I HEP that the patient can continue for long term pain management and spinal protection.      Other therapeutic activities and/or exercises will be prescribed depending on the patients progress or lack there of.     Visit Diagnoses:    ICD-10-CM ICD-9-CM   1. Right sided sciatica  M54.31 724.3   2. Low back pain with right-sided sciatica, unspecified back pain laterality, unspecified chronicity  M54.41 724.3       Timed:  Manual Therapy:         mins  42657;  Therapeutic Exercise:    27     mins  57554;     Aquatic Ther Ex:         mins  73873;     Neuromuscular Sheree:        mins  91903;    Therapeutic Activity:          mins  68629;     Gait Training:           mins  73803;     Ultrasound:          mins  02265;    Paraffin:        mins  17171;  Electrical Stimulation:         mins  86276 ( );    Untimed:  Electrical Stimulation:         mins  02318 ( );  Mechanical Traction:         mins  96595;     Timed Treatment:   27   mins   Total Treatment:     45   mins    PT SIGNATURE: Haley Patel, ADRIANE DPT, CSCS   DATE TREATMENT INITIATED: 11/9/2020    Initial Certification  Certification Period: 2/7/2021  I certify that the therapy services are furnished while this patient is under my care.  The services outlined above are required by this patient, and will be reviewed every 90 days.     PHYSICIAN: Kassidy Prakash MD      DATE:     Please sign and return via fax to  .. Thank you, [facility].

## 2020-11-10 ENCOUNTER — TREATMENT (OUTPATIENT)
Dept: PHYSICAL THERAPY | Facility: CLINIC | Age: 75
End: 2020-11-10

## 2020-11-10 DIAGNOSIS — M54.31 RIGHT SIDED SCIATICA: Primary | ICD-10-CM

## 2020-11-10 DIAGNOSIS — M54.41 LOW BACK PAIN WITH RIGHT-SIDED SCIATICA, UNSPECIFIED BACK PAIN LATERALITY, UNSPECIFIED CHRONICITY: ICD-10-CM

## 2020-11-10 PROCEDURE — 97110 THERAPEUTIC EXERCISES: CPT | Performed by: PHYSICAL THERAPIST

## 2020-11-10 NOTE — PROGRESS NOTES
"   Physical Therapy Daily Progress Note      Patient: Neal Roblero   : 1945  Referring practitioner: Kassidy Prakash MD  Date of Initial Visit: Type: THERAPY  Noted: 2020  Today's Date: 11/10/2020  Patient seen for 2 sessions    Next MD appt: 2021.  Recertification: 2020     Latex allergy  English as a second language         Neal Roblero reports: N/A        Subjective Evaluation    History of Present Illness    Subjective comment: pt states that she is only having a little bit of pain. Not bad today. States that the front of her R leg stays numbPain  Current pain rating: 3           Objective          Postural Observations    Additional Postural Observation Details  No malalignment       See Exercise, Manual, and Modality Logs for complete treatment.       Assessment & Plan     Assessment  Assessment details: Pt has poor posture throughout treatment- especially while on PROII. Pt had increase pull/stretch with seated sciatic nerve glides. Quite flexible with SKTC but pt reported that it felt good to do so. Little to no pain reported throughout treatment. ASIS =    Goals  Plan Goals: Short Term Goals:  1) I with HEP and have additions/changes by next recertification    2) Patient able to show proper log roll technique.    3) Patient to be more aware of posture and posture correction techniques    4) AROM for lumbar extension >= 25°.     5) AROM B Lumbar SB WNL    6) AROM B Lumbar ROT >= 75% of range.      Long Term Goals:  1) Patient to have AROM for the lumbar spine all WNL, no increase in pain.    2) B LE 5/5.    3) Patient able to perform 20 Bridges with UE \"X\" over Pball with no increase in pain.    4) Patient able to show proper lifting technique floor to waist with no increase in pain.    5) Patient able to show proper ergonomics for mopping/sweeping/vacumming.    6) Patient to report radicular symptoms are now controlled with there ex when they occur.    7) I with final " HEP.          Plan  Plan details: Try seated TA/PN core stab next visit.        Visit Diagnoses:    ICD-10-CM ICD-9-CM   1. Right sided sciatica  M54.31 724.3   2. Low back pain with right-sided sciatica, unspecified back pain laterality, unspecified chronicity  M54.41 724.3       Progress per Plan of Care and Progress strengthening /stabilization /functional activity           Timed:  Manual Therapy:         mins  00228;  Therapeutic Exercise:    42     mins  85354;     Neuromuscular Sheree:        mins  84158;    Therapeutic Activity:          mins  52659;     Gait Training:           mins  38247;     Ultrasound:          mins  86751;    Electrical Stimulation:         mins  83789 ( );    Untimed:  Electrical Stimulation:         mins  55541 ( );  Mechanical Traction:         mins  03814;     Timed Treatment:   42   mins   Total Treatment:     42   mins  Sunitha Goldsmith PTA  Physical Therapist

## 2020-11-11 ENCOUNTER — TELEPHONE (OUTPATIENT)
Dept: PHYSICAL THERAPY | Facility: CLINIC | Age: 75
End: 2020-11-11

## 2020-11-11 DIAGNOSIS — M54.31 RIGHT SIDED SCIATICA: Primary | ICD-10-CM

## 2020-11-11 DIAGNOSIS — M54.41 LOW BACK PAIN WITH RIGHT-SIDED SCIATICA, UNSPECIFIED BACK PAIN LATERALITY, UNSPECIFIED CHRONICITY: ICD-10-CM

## 2020-11-11 NOTE — TELEPHONE ENCOUNTER
Patient called in stating that after her initial evaluation she had less pain and felt better. She reports that after her last visit she had a lot more pain. She reports she doesn't want to see the other therapist again and would like to just stay with me. Discussed with patient that I am out of the building on Tuesday when she is here but I can put her on a different therapist, she agrees. Also switched Thursdays appoint to PT schedule. Discussed with patient about doing JONEL and using ice to help ease pain until next visit. Also discussed with PTA who is seeing the patient next to stick with more extension exercises versus flexion exercises.

## 2020-11-17 ENCOUNTER — TREATMENT (OUTPATIENT)
Dept: PHYSICAL THERAPY | Facility: CLINIC | Age: 75
End: 2020-11-17

## 2020-11-17 DIAGNOSIS — M54.41 LOW BACK PAIN WITH RIGHT-SIDED SCIATICA, UNSPECIFIED BACK PAIN LATERALITY, UNSPECIFIED CHRONICITY: ICD-10-CM

## 2020-11-17 DIAGNOSIS — M54.31 RIGHT SIDED SCIATICA: Primary | ICD-10-CM

## 2020-11-17 PROCEDURE — 97530 THERAPEUTIC ACTIVITIES: CPT | Performed by: PHYSICAL THERAPIST

## 2020-11-17 PROCEDURE — 97110 THERAPEUTIC EXERCISES: CPT | Performed by: PHYSICAL THERAPIST

## 2020-11-17 NOTE — PROGRESS NOTES
Physical Therapy Daily Progress Note    Patient: Neal Roblero   : 1945  Diagnosis/ICD-10 Code:     Diagnosis Plan   1. Right sided sciatica     2. Low back pain with right-sided sciatica, unspecified back pain laterality, unspecified chronicity       Referring practitioner: Kassidy Prakash MD  Date of Initial Visit: Type: THERAPY  Noted: 2020  Today's Date: 2020  Patient seen for 3 sessions      PT Recheck Due: 2020  PT MD Visit: 2021       Neal Roblero reports: pain is worse       Subjective Evaluation    History of Present Illness    Subjective comment: Pt reports her R hip and leg are hurting her all the way down since Tuesday.  Reports she is doing her exercises at home from last PT visit. Pain  Current pain ratin             Objective   See Exercise, Manual, and Modality Logs for complete treatment.       Assessment & Plan     Assessment  Assessment details: Pt initially reports 10/10 pain with education on pain scale, pt defers to 8/10 in low back and down R leg.  Pt able to recall HEP with fair demonstration.  Ambulates with slightly flexed posture and poor body mechanics to place/retrieve personal items from floor.  Pt educated on improved body mechanics.  Discussed activity at home that could be aggravating symptoms.  Pt reports when her back/leg feel good she walks her dogs 2-3 miles and digs in the yard.  Pt educated to avoid over activity , to rest and utilize ice and stretches while in therapy until advised otherwise.  Pt struggled to change postion supine to prone.  Education on proper log roll technique and positioning in prone with back extension.      Goals  Plan Goals: Short Term Goals:  1) I with HEP and have additions/changes by next recertification    2) Patient able to show proper log roll technique.    3) Patient to be more aware of posture and posture correction techniques    4) AROM for lumbar extension >= 25°.     5) AROM B Lumbar SB WNL    6) AROM B Lumbar  "ROT >= 75% of range.      Long Term Goals:  1) Patient to have AROM for the lumbar spine all WNL, no increase in pain.    2) B LE 5/5.    3) Patient able to perform 20 Bridges with UE \"X\" over Pball with no increase in pain.    4) Patient able to show proper lifting technique floor to waist with no increase in pain.    5) Patient able to show proper ergonomics for mopping/sweeping/vacumming.    6) Patient to report radicular symptoms are now controlled with there ex when they occur.    7) I with final HEP.     Plan  Plan details: Continue with extension focused exercises and stretches.  Next visit add Bridges for core stability. Assess pelvic alignment.  Add sit to stands with ext.       Progress per Plan of Care and Progress strengthening /stabilization /functional activity            Timed:  Manual Therapy:         mins  56716;  Therapeutic Exercise:    35     mins  46674;   Aquatic Therex :        mins  15637    Neuromuscular Sheree:        mins  01327;    Therapeutic Activity:     10     mins  87431;     Gait Training:           mins  26838;     Ultrasound:          mins  68509;    Electrical Stimulation:         mins  49157 ( );    Untimed:  Electrical Stimulation:         mins  47108 ( );  Mechanical Traction:         mins  32334;   Orthotic fit/train:         mins  83952    Timed Treatment:   45   mins   Total Treatment:     45   mins  Carolina Bain PTA  Physical Therapist Assistant  "

## 2020-11-19 ENCOUNTER — TELEPHONE (OUTPATIENT)
Dept: ORTHOPEDICS | Facility: OTHER | Age: 75
End: 2020-11-19

## 2020-11-23 ENCOUNTER — TELEPHONE (OUTPATIENT)
Dept: PHYSICAL THERAPY | Facility: CLINIC | Age: 75
End: 2020-11-23

## 2020-11-23 DIAGNOSIS — M54.31 RIGHT SIDED SCIATICA: Primary | ICD-10-CM

## 2020-11-23 DIAGNOSIS — M54.41 LOW BACK PAIN WITH RIGHT-SIDED SCIATICA, UNSPECIFIED BACK PAIN LATERALITY, UNSPECIFIED CHRONICITY: ICD-10-CM

## 2020-11-23 NOTE — TELEPHONE ENCOUNTER
Called patient secondary to no show for appointment today. Patient states that she is still in a lot of pain. States that other than her first visit to therapy every therapy has made her hurt worse. States that she hasn't contacted her referring provider yet. States that over the weekend she went to the ER secondary to pain. They wanted to do an Xray but she deferred and asked for an MRI. States that ER told her that they cannot and do not do MRI's that her doctor will have to refer for that. Patient asked what she should do. Patient is advised that if she feels like therapy is making her worse that she needs to contact her referring provider Kassidy Prakash MD to seek medical advice regarding her pain. Patient is notified that if she does not feel like she can make her appointments she needs to contact therapy to cancel.

## 2020-12-01 ENCOUNTER — TELEPHONE (OUTPATIENT)
Dept: PHYSICAL THERAPY | Facility: CLINIC | Age: 75
End: 2020-12-01

## 2020-12-01 DIAGNOSIS — M54.31 RIGHT SIDED SCIATICA: Primary | ICD-10-CM

## 2020-12-01 DIAGNOSIS — M54.41 LOW BACK PAIN WITH RIGHT-SIDED SCIATICA, UNSPECIFIED BACK PAIN LATERALITY, UNSPECIFIED CHRONICITY: ICD-10-CM

## 2020-12-01 NOTE — TELEPHONE ENCOUNTER
called secondary to no show. patient states that she still has been in a lot of pain and still have not spoken with her referring provider. states that her   novermber 12 and her world is just turned upside down right now. patient reports that she is scared about her pain. appointments are to be cancelled at this time until patient can speak to her provider. patient will call back if there is a need to resume therapy.

## 2021-02-23 ENCOUNTER — TRANSCRIBE ORDERS (OUTPATIENT)
Dept: PHYSICAL THERAPY | Facility: CLINIC | Age: 76
End: 2021-02-23

## 2021-02-23 ENCOUNTER — DOCUMENTATION (OUTPATIENT)
Dept: PHYSICAL THERAPY | Facility: CLINIC | Age: 76
End: 2021-02-23

## 2021-02-23 DIAGNOSIS — M54.31 RIGHT SIDED SCIATICA: Primary | ICD-10-CM

## 2021-02-23 DIAGNOSIS — M54.41 LOW BACK PAIN WITH RIGHT-SIDED SCIATICA, UNSPECIFIED BACK PAIN LATERALITY, UNSPECIFIED CHRONICITY: ICD-10-CM

## 2021-02-23 NOTE — PROGRESS NOTES
Discharge Summary  Discharge Summary from Physical Therapy Report        Number of Visits: 3 visits      Discharge Status of Patient: See PT Note dated 11/17/2020    Goals: Partially Met    Discharge Plan: Patient to return to referring/providing physician    Comments: Patient stopped PT to follow up with MD and then  passed away and unable to return.    Date of Discharge 01/03/2020        Haley Patel, PT DPT, CSCS  Physical Therapist

## 2021-03-01 ENCOUNTER — TREATMENT (OUTPATIENT)
Dept: PHYSICAL THERAPY | Facility: CLINIC | Age: 76
End: 2021-03-01

## 2021-03-01 DIAGNOSIS — M54.41 LOW BACK PAIN WITH RIGHT-SIDED SCIATICA, UNSPECIFIED BACK PAIN LATERALITY, UNSPECIFIED CHRONICITY: ICD-10-CM

## 2021-03-01 DIAGNOSIS — M54.16 RADICULOPATHY, LUMBAR REGION: Primary | ICD-10-CM

## 2021-03-01 DIAGNOSIS — M54.31 RIGHT SIDED SCIATICA: ICD-10-CM

## 2021-03-01 PROCEDURE — 97110 THERAPEUTIC EXERCISES: CPT | Performed by: PHYSICAL THERAPIST

## 2021-03-01 PROCEDURE — 97162 PT EVAL MOD COMPLEX 30 MIN: CPT | Performed by: PHYSICAL THERAPIST

## 2021-03-01 NOTE — PROGRESS NOTES
Physical Therapy Initial Evaluation and Plan of Care      Patient: Neal Roblero   : 1945  Diagnosis/ICD-10 Code:  Radiculopathy, lumbar region [M54.16]  Referring practitioner: Kelvin Garza MD  Date of Initial Visit: 3/1/2021  Today's Date: 3/1/2021  Patient seen for 1 sessions    Next MD appt: TBD .  Recertification: 2021          Subjective Questionnaire: Oswestry: 35/50 = 70%      Subjective Evaluation    History of Present Illness    Subjective comment: Patient reports she had an MRI of her back. She reports she is stil haivng the R leg pain and down to her knee. She rpeorts her knee feels cold and funny feeling. She erports she also has RA and seeing a specialist for that at [facility].. SHe erports is Spetember she got a shot form a chinease doctor and that caused it to really hurt a lot.  Patient Occupation: Unemployeed Quality of life: good    Pain  Current pain ratin  At best pain ratin  At worst pain ratin  Location: Low back and R leg  Quality: radiating  Relieving factors: medications, ice and rest (IBU)  Aggravating factors: repetitive movement, squatting, ambulation, prolonged positioning and lifting  Progression: no change    Social Support  Lives in: one-story house (with basement)  Lives with: alone    Diagnostic Tests  X-ray: abnormal  MRI studies: abnormal    Treatments  Previous treatment: physical therapy and injection treatment  Current treatment: medication  Patient Goals  Patient goals for therapy: decreased pain, independence with ADLs/IADLs and return to sport/leisure activities             Objective          Static Posture     Thoracic Spine  Flattened thoracic spine.    Lumbar Spine   Flattened.     Comments  Pelvis/Sacrum are level, no LLD to note.    Postural Observations  Seated posture: fair  Standing posture: fair        Palpation     Right Tenderness of the piriformis.     Tenderness     Lumbar Spine  Tenderness in the facet joint.      Additional Tenderness Details  B L3-L5    Neurological Testing     Sensation     Lumbar   Left   Intact: light touch    Right   Intact: light touch  Paresthesia: light touch    Reflexes   Left   Patellar (L4): normal (2+)  Achilles (S1): normal (2+)    Right   Patellar (L4): normal (2+)  Achilles (S1): normal (2+)    Active Range of Motion     Lumbar   Flexion: 110 (hands flat on the floor) degrees   Extension: 18 degrees   Left lateral flexion: Active left lumbar lateral flexion: 50% of range.   Right lateral flexion: Active right lumbar lateral flexion: 50% of range.   Left rotation: Active left lumbar rotation: 50% of range.   Right rotation: Active right lumbar rotation: 50% of range.     Strength/Myotome Testing     Left Hip   Planes of Motion   Flexion: 4  Extension: 5  Abduction: 5  Adduction: 5    Right Hip   Planes of Motion   Flexion: 4  Extension: 4+  Abduction: 5  Adduction: 5    Left Knee   Flexion: 5  Extension: 5    Right Knee   Flexion: 5  Extension: 4+    Left Ankle/Foot   Dorsiflexion: 5  Plantar flexion: 5    Right Ankle/Foot   Dorsiflexion: 5  Plantar flexion: 5    Tests     Lumbar     Left   Negative crossed SLR, passive SLR, quadrant and valsalva.     Right   Positive quadrant.   Negative crossed SLR, passive SLR and valsalva.     Left Pelvic Girdle/Sacrum   Negative: sacrum compression, gapping and sacral spring.     Right Pelvic Girdle/Sacrum   Negative: sacrum compression, gapping and sacral spring.     Left Hip   Negative CHECO, FADIR, piriformis, SI compression and SI distraction.   90/90 SLR: Negative.   SLR: Negative.     Right Hip   Negative CHECO, FADIR, piriformis, SI compression and SI distraction.   90/90 SLR: Negative.  SLR: Negative.     Ambulation     Comments   FWB, non-antalgic gait, no distress, no assistive device, no significant gait deviation, normal arm swing with gait.          Assessment & Plan     Assessment  Impairments: abnormal or restricted ROM, activity  "intolerance, impaired physical strength, lacks appropriate home exercise program and pain with function  Assessment details: Return of a previous patint who had R radicular pain last year after doing gardening. Patients pain continued to worsen so she stopped PT and ended up getting an MRI which showed significant denerative changes with anterior subluxations due to arthritic formation at the facet joints.  English is a 2nd language and sometimes things would have to be reworded, but no difficulties after that. Patient did well with all HEP exercises today. Patient was given written copies of HEP.  Prognosis: fair  Prognosis details: Barriers to Rehab: Include significant or possible arthritic/degenerative changes that have occurred within the joints/spine, The chronicity of this issue.    Safety Issues: Latex allergy; English as a second language  Functional Limitations: carrying objects, lifting, sleeping, uncomfortable because of pain, sitting, standing and unable to perform repetitive tasks  Goals  Plan Goals: Short Term Goals:  1) I with HEP and have additions/changes by next recertification    2) Patient able to show proper log roll technique.    3) Patient to be more aware of posture and posture correction techniques    4) AROM for lumbar extension >= 25°.     5) AROM B Lumbar SB  >= 75% of range.    6) AROM B Lumbar ROT >= 75% of range.      Long Term Goals:  1) Patient to have AROM for the lumbar spine all WNL, no increase in pain.    2) B LE 5/5.    3) Patient able to perform 20 Bridges with UE \"X\" over Pball with no increase in pain.    4) Patient able to show proper lifting technique floor to waist with no increase in pain.    5) Patient able to show proper ergonomics for mopping/sweeping/vacumming.    6) Patient to report radicular symptoms are now controlled with there ex when they occur.    7) I with final HEP.        Plan  Therapy options: will be seen for skilled physical therapy services  Planned " modality interventions: cryotherapy, high voltage pulsed current (pain management) and thermotherapy (hydrocollator packs)  Planned therapy interventions: abdominal trunk stabilization, body mechanics training, flexibility, functional ROM exercises, home exercise program, transfer training, therapeutic activities, stretching, strengthening, spinal/joint mobilization, soft tissue mobilization, postural training, manual therapy, neuromuscular re-education, ADL retraining and IADL retraining  Treatment plan discussed with: patient  Plan details: Progress ROM, strength, core stab, radicular centralization, neural extensibility, body mechanics to an I HEP that the patient can continue for long term pain management and spinal protection. Concentrate on Hiram flexion activities due to significant anterior subluxations found on MRI.      Other therapeutic activities and/or exercises will be prescribed depending on the patients progress or lack there of.     Visit Diagnoses:    ICD-10-CM ICD-9-CM   1. Radiculopathy, lumbar region  M54.16 724.4   2. Right sided sciatica  M54.31 724.3   3. Low back pain with right-sided sciatica, unspecified back pain laterality, unspecified chronicity  M54.41 724.3       Timed:  Manual Therapy:         mins  67235;  Therapeutic Exercise:    24     mins  87322;      Neuromuscular Sheree:        mins  37374;    Therapeutic Activity:          mins  99282;     Gait Training:           mins  24671;     Ultrasound:          mins  37924;    Paraffin:        mins  71153;  Electrical Stimulation:         mins  83125 ( );    Untimed:  Electrical Stimulation:         mins  25423 ( );  Mechanical Traction:         mins  31078;     Timed Treatment:   24   mins   Total Treatment:     45   mins    PT SIGNATURE: Haley Patel PT DPT, [facility]   DATE TREATMENT INITIATED: 3/1/2021    Initial Certification  Certification Period: 5/30/2021  I certify that the therapy services are furnished  while this patient is under my care.  The services outlined above are required by this patient, and will be reviewed every 90 days.     PHYSICIAN: Kelvin Garza MD  DATE:     Please sign and return via fax to  .. Thank you, [facility].

## 2021-03-03 ENCOUNTER — TREATMENT (OUTPATIENT)
Dept: PHYSICAL THERAPY | Facility: CLINIC | Age: 76
End: 2021-03-03

## 2021-03-03 DIAGNOSIS — M54.41 LOW BACK PAIN WITH RIGHT-SIDED SCIATICA, UNSPECIFIED BACK PAIN LATERALITY, UNSPECIFIED CHRONICITY: ICD-10-CM

## 2021-03-03 DIAGNOSIS — M54.31 RIGHT SIDED SCIATICA: ICD-10-CM

## 2021-03-03 DIAGNOSIS — M54.16 RADICULOPATHY, LUMBAR REGION: Primary | ICD-10-CM

## 2021-03-03 PROCEDURE — 97110 THERAPEUTIC EXERCISES: CPT | Performed by: PHYSICAL THERAPIST

## 2021-03-03 NOTE — PROGRESS NOTES
"Physical Therapy Daily Progress Note    Patient: Neal Roblero   : 1945  Diagnosis/ICD-10 Code:     Diagnosis Plan   1. Radiculopathy, lumbar region     2. Right sided sciatica     3. Low back pain with right-sided sciatica, unspecified back pain laterality, unspecified chronicity       Referring practitioner: Kelvin Garza MD  Date of Initial Visit: Type: THERAPY  Noted: 3/1/2021  Today's Date: 3/3/2021  Patient seen for 2 sessions      PT Recheck Due: 2021  PT MD Visit: TBD        Neal Roblero      Subjective Evaluation    History of Present Illness    Subjective comment: states that yesterday and the day before were really bad with pain. Pain  Current pain rating: 3             Objective   See Exercise, Manual, and Modality Logs for complete treatment.       Assessment & Plan     Assessment  Assessment details: Patient arrives nearly 15 minutes late for her appointment. Decreased treatment time secondary to this fact. Patient needs cueing throughout but completes with no complaints. Initiated Trunk Rotation S and QL S secondary to ROM deficits in those directions.     Goals  Plan Goals: Short Term Goals:  1) I with HEP and have additions/changes by next recertification (progressing)    2) Patient able to show proper log roll technique. (ongoing)    3) Patient to be more aware of posture and posture correction techniques    4) AROM for lumbar extension >= 25°.     5) AROM B Lumbar SB  >= 75% of range.    6) AROM B Lumbar ROT >= 75% of range.      Long Term Goals:  1) Patient to have AROM for the lumbar spine all WNL, no increase in pain.    2) B LE 5/5.    3) Patient able to perform 20 Bridges with UE \"X\" over Pball with no increase in pain.    4) Patient able to show proper lifting technique floor to waist with no increase in pain.    5) Patient able to show proper ergonomics for mopping/sweeping/vacumming.    6) Patient to report radicular symptoms are now controlled with there ex when " they occur.    7) I with final HEP.    Plan  Plan details: Resume DKTC stretch and begin DKTC physioball rolls      Progress per Plan of Care and Progress strengthening /stabilization /functional activity            Timed:  Manual Therapy:         mins  32938;  Therapeutic Exercise:    35    mins  13523;   Aquatic Therex :        mins  24834    Neuromuscular Sheree:        mins  55811;    Therapeutic Activity:          mins  26319;     Gait Training:           mins  37991;     Ultrasound:          mins  27238;    Electrical Stimulation:         mins  34816 ( );    Untimed:  Electrical Stimulation:         mins  03533 ( );  Mechanical Traction:         mins  28753;   Paraffin:         mins  96567;  Orthotic fit/train:         mins  66472  Iontophoresis:          mins  84753    Timed Treatment:  35    mins   Total Treatment:     35   mins  Consuelo Key PTA  Physical Therapist Assistant

## 2021-03-09 ENCOUNTER — TREATMENT (OUTPATIENT)
Dept: PHYSICAL THERAPY | Facility: CLINIC | Age: 76
End: 2021-03-09

## 2021-03-09 DIAGNOSIS — M54.41 LOW BACK PAIN WITH RIGHT-SIDED SCIATICA, UNSPECIFIED BACK PAIN LATERALITY, UNSPECIFIED CHRONICITY: ICD-10-CM

## 2021-03-09 DIAGNOSIS — M54.31 RIGHT SIDED SCIATICA: ICD-10-CM

## 2021-03-09 DIAGNOSIS — M54.16 RADICULOPATHY, LUMBAR REGION: Primary | ICD-10-CM

## 2021-03-09 PROCEDURE — 97140 MANUAL THERAPY 1/> REGIONS: CPT | Performed by: PHYSICAL THERAPIST

## 2021-03-09 PROCEDURE — 97110 THERAPEUTIC EXERCISES: CPT | Performed by: PHYSICAL THERAPIST

## 2021-03-09 NOTE — PROGRESS NOTES
"Physical Therapy Daily Progress Note    Patient: Neal Roblero   : 1945  Diagnosis/ICD-10 Code:     Diagnosis Plan   1. Radiculopathy, lumbar region     2. Right sided sciatica     3. Low back pain with right-sided sciatica, unspecified back pain laterality, unspecified chronicity       Referring practitioner: Kelvin Garza MD  Date of Initial Visit: Type: THERAPY  Noted: 3/1/2021  Today's Date: 3/9/2021  Patient seen for 3 sessions      PT Recheck Due: 2021  PT MD Visit: TBD        Neal Roblero        Subjective Evaluation    History of Present Illness    Subjective comment: Pt states she is still having pain in her R hip and down her R leg.   A little better but still hurts.  Pain  Current pain rating: 3             Objective   See Exercise, Manual, and Modality Logs for complete treatment.       Assessment & Plan     Assessment  Assessment details: Pt with good tolerance with all therex this date.  Assessed alignment with a slight discrepancy noted on R that was corrected with HS MET.  Gait pattern improved exiting clinic with more equal stance bilat.  No complaints of increased pain during session.  Reports will ice at home post tx session.  Additions to HEP with written handout and resistance band provided.  Pt verbalized understanding.      Goals  Plan Goals: Short Term Goals:  1) I with HEP and have additions/changes by next recertification (progressing)    2) Patient able to show proper log roll technique. (ongoing)    3) Patient to be more aware of posture and posture correction techniques    4) AROM for lumbar extension >= 25°.     5) AROM B Lumbar SB  >= 75% of range.    6) AROM B Lumbar ROT >= 75% of range.      Long Term Goals:  1) Patient to have AROM for the lumbar spine all WNL, no increase in pain.    2) B LE 5/5.    3) Patient able to perform 20 Bridges with UE \"X\" over Pball with no increase in pain.    4) Patient able to show proper lifting technique floor to waist with no " increase in pain.    5) Patient able to show proper ergonomics for mopping/sweeping/vacumming.    6) Patient to report radicular symptoms are now controlled with there ex when they occur.    7) I with final HEP.    Plan  Plan details: Next visit add seated piriformis st bilaterally.        Progress per Plan of Care and Progress strengthening /stabilization /functional activity            Timed:  Manual Therapy:    8     mins  05995;  Therapeutic Exercise:    45     mins  26149;   Aquatic Therex :        mins  64029    Neuromuscular Sheree:        mins  39169;    Therapeutic Activity:          mins  30785;     Gait Training:           mins  96334;     Ultrasound:          mins  28432;    Electrical Stimulation:         mins  78892 ( );    Untimed:  Electrical Stimulation:         mins  31718 ( );  Mechanical Traction:         mins  61089;   Orthotic fit/train:         mins  54307    Timed Treatment:   53   mins   Total Treatment:     53   mins  Carolina Bain PTA  Physical Therapist Assistant

## 2021-03-11 ENCOUNTER — TREATMENT (OUTPATIENT)
Dept: PHYSICAL THERAPY | Facility: CLINIC | Age: 76
End: 2021-03-11

## 2021-03-11 DIAGNOSIS — M54.31 RIGHT SIDED SCIATICA: ICD-10-CM

## 2021-03-11 DIAGNOSIS — M54.41 LOW BACK PAIN WITH RIGHT-SIDED SCIATICA, UNSPECIFIED BACK PAIN LATERALITY, UNSPECIFIED CHRONICITY: ICD-10-CM

## 2021-03-11 DIAGNOSIS — M54.16 RADICULOPATHY, LUMBAR REGION: Primary | ICD-10-CM

## 2021-03-11 PROCEDURE — 97110 THERAPEUTIC EXERCISES: CPT | Performed by: PHYSICAL THERAPIST

## 2021-03-11 NOTE — PROGRESS NOTES
"Physical Therapy Daily Progress Note    Patient: Neal Roblero   : 1945  Diagnosis/ICD-10 Code:     Diagnosis Plan   1. Radiculopathy, lumbar region     2. Right sided sciatica     3. Low back pain with right-sided sciatica, unspecified back pain laterality, unspecified chronicity       Referring practitioner: Kelvin Garza MD  Date of Initial Visit: Type: THERAPY  Noted: 3/1/2021  Today's Date: 3/11/2021  Patient seen for 4 sessions      PT Recheck Due: 2021  PT MD Visit: CHRISTIAN       Neal Roblero        Subjective Evaluation    History of Present Illness    Subjective comment: states that her right hip and leg is hurting her. feels better after therapy. Pain  Current pain rating: 3             Objective   See Exercise, Manual, and Modality Logs for complete treatment.       Assessment & Plan     Assessment  Assessment details: Patient did well with log roll technique this treatment. Gives good effort. With bridges has to stop at 10 reps secondary to increased pain but it is improved modifying to just PPT for remainder 10 reps. Reports decreased pain with treatment today.     Goals  Plan Goals: Short Term Goals:  1) I with HEP and have additions/changes by next recertification (progressing)    2) Patient able to show proper log roll technique. (met, ongoing)    3) Patient to be more aware of posture and posture correction techniques    4) AROM for lumbar extension >= 25°.     5) AROM B Lumbar SB  >= 75% of range.    6) AROM B Lumbar ROT >= 75% of range.      Long Term Goals:  1) Patient to have AROM for the lumbar spine all WNL, no increase in pain.    2) B LE 5/5.    3) Patient able to perform 20 Bridges with UE \"X\" over [place] with no increase in pain.    4) Patient able to show proper lifting technique floor to waist with no increase in pain.    5) Patient able to show proper ergonomics for mopping/sweeping/vacumming.    6) Patient to report radicular symptoms are now controlled with there ex when " they occur.    7) I with final HEP.    Plan  Plan details: Next visit tband to BKLL, continue with flexion based exercises.       Progress per Plan of Care and Progress strengthening /stabilization /functional activity            Timed:  Manual Therapy:         mins  75595;  Therapeutic Exercise:    45     mins  54819;   Aquatic Therex :        mins  46508    Neuromuscular Sheree:        mins  48838;    Therapeutic Activity:          mins  74156;     Gait Training:           mins  72873;     Ultrasound:          mins  87476;    Electrical Stimulation:         mins  43009 ( );    Untimed:  Electrical Stimulation:         mins  72852 ( );  Mechanical Traction:         mins  07257;   Paraffin:         mins  56845;  Orthotic fit/train:         mins  51238  Iontophoresis:          mins  13767    Timed Treatment:   45   mins   Total Treatment:     45   mins  Consuelo Key PTA  Physical Therapist Assistant

## 2021-04-14 ENCOUNTER — TRANSCRIBE ORDERS (OUTPATIENT)
Dept: ADMINISTRATIVE | Facility: HOSPITAL | Age: 76
End: 2021-04-14

## 2021-05-20 ENCOUNTER — OFFICE VISIT (OUTPATIENT)
Dept: CARDIAC SURGERY | Facility: CLINIC | Age: 76
End: 2021-05-20

## 2021-05-20 VITALS
SYSTOLIC BLOOD PRESSURE: 160 MMHG | WEIGHT: 91 LBS | HEART RATE: 90 BPM | OXYGEN SATURATION: 98 % | BODY MASS INDEX: 20.47 KG/M2 | HEIGHT: 56 IN | DIASTOLIC BLOOD PRESSURE: 84 MMHG | TEMPERATURE: 98.1 F

## 2021-05-20 DIAGNOSIS — E78.2 MIXED HYPERLIPIDEMIA: ICD-10-CM

## 2021-05-20 DIAGNOSIS — I10 ESSENTIAL HYPERTENSION: ICD-10-CM

## 2021-05-20 DIAGNOSIS — I65.23 BILATERAL CAROTID ARTERY STENOSIS: Primary | ICD-10-CM

## 2021-05-20 DIAGNOSIS — M15.9 PRIMARY OSTEOARTHRITIS INVOLVING MULTIPLE JOINTS: ICD-10-CM

## 2021-05-20 PROCEDURE — 99204 OFFICE O/P NEW MOD 45 MIN: CPT | Performed by: THORACIC SURGERY (CARDIOTHORACIC VASCULAR SURGERY)

## 2021-05-20 RX ORDER — LEFLUNOMIDE 10 MG/1
10 TABLET ORAL DAILY
COMMUNITY

## 2021-05-20 RX ORDER — PREDNISONE 1 MG/1
10 TABLET ORAL DAILY
COMMUNITY
Start: 2020-12-16 | End: 2021-12-17

## 2021-05-20 RX ORDER — CELECOXIB 200 MG/1
200 CAPSULE ORAL DAILY
COMMUNITY

## 2021-06-14 PROBLEM — M15.9 PRIMARY OSTEOARTHRITIS INVOLVING MULTIPLE JOINTS: Status: ACTIVE | Noted: 2021-06-14

## 2021-06-14 PROBLEM — I65.23 BILATERAL CAROTID ARTERY STENOSIS: Status: ACTIVE | Noted: 2021-06-14

## 2021-06-14 RX ORDER — CHLORAL HYDRATE 500 MG
CAPSULE ORAL 4 TIMES DAILY
COMMUNITY

## 2021-06-14 NOTE — PROGRESS NOTES
5/20/2021    Neal Roblero  1945    Chief Complaint:    Chief Complaint   Patient presents with   • Carotid Artery Disease       HPI:      PCP:  Shon Martini MD    75 y.o. female with HTN(stable, increased risk stroke, rupture) and Hyperlipidemia(stable, increased risk cardiovascular events) , DJD(stable), carotid stenosis(new, increase risk stroke).  never smoked.  Asymptomatic carotid stenosis.  No TIA stroke amaurosis.  No MI claudication. No other associated signs, symptoms or modifying factors.    1/2021 Carotid Duplex:  DEJAH 50-69% (191/50cm/s, ratio 3.4), LICA 0-49% (no velocity data).  Antegrade verts.    The following portions of the patient's history were reviewed and updated as appropriate: allergies, current medications, past family history, past medical history, past social history, past surgical history and problem list.  Recent images independently reviewed.  Available laboratory values reviewed.    PMH:  Past Medical History:   Diagnosis Date   • Allergic    • Anemia    • Arthritis     rheumatoid arthritis   • Cataract    • Hypertension    • Pre-diabetes      Past Surgical History:   Procedure Laterality Date   • TONSILLECTOMY  1975     No family history on file.  Social History     Tobacco Use   • Smoking status: Never Smoker   • Smokeless tobacco: Never Used   Vaping Use   • Vaping Use: Never used   Substance Use Topics   • Alcohol use: No   • Drug use: Never       ALLERGIES:  Allergies   Allergen Reactions   • Dilaudid [Hydromorphone] Itching and Swelling   • Latex Swelling   • Penicillins Swelling         MEDICATIONS:    Current Outpatient Medications:   •  celecoxib (CeleBREX) 200 MG capsule, Take 200 mg by mouth Daily., Disp: , Rfl:   •  leflunomide (ARAVA) 10 MG tablet, Take 10 mg by mouth Daily., Disp: , Rfl:   •  Omega-3 Fatty Acids (fish oil) 1000 MG capsule capsule, Take  by mouth Daily With Breakfast., Disp: , Rfl:   •  predniSONE (DELTASONE) 5 MG tablet, Take 10 mg by mouth  "Daily., Disp: , Rfl:     Review of Systems   Review of Systems   Constitutional: Positive for malaise/fatigue. Negative for weight loss.   Cardiovascular: Negative for chest pain, claudication and dyspnea on exertion.   Respiratory: Negative for cough and shortness of breath.    Skin: Negative for color change and poor wound healing.   Neurological: Negative for dizziness, numbness and weakness.   Psychiatric/Behavioral: Positive for depression.       Physical Exam   Vitals:    05/20/21 1309   BP: 160/84   BP Location: Left arm   Patient Position: Sitting   Cuff Size: Adult   Pulse: 90   Temp: 98.1 °F (36.7 °C)   TempSrc: Temporal   SpO2: 98%   Weight: 41.3 kg (91 lb)   Height: 142.2 cm (56\")     Physical Exam  Constitutional:       General: She is not in acute distress.     Appearance: She is not ill-appearing.   HENT:      Right Ear: Hearing normal.      Left Ear: Hearing normal.      Nose: No nasal deformity.      Mouth/Throat:      Dentition: Normal dentition. Does not have dentures.   Cardiovascular:      Rate and Rhythm: Normal rate and regular rhythm.      Pulses:           Carotid pulses are 2+ on the right side and 2+ on the left side.       Radial pulses are 2+ on the right side and 2+ on the left side.        Posterior tibial pulses are 2+ on the right side and 2+ on the left side.      Heart sounds: No murmur heard.     Pulmonary:      Effort: Pulmonary effort is normal.      Breath sounds: Normal breath sounds.   Abdominal:      General: There is no distension.      Palpations: Abdomen is soft. There is no mass.      Tenderness: There is no abdominal tenderness.   Musculoskeletal:         General: No deformity.      Comments: Gait normal.    Skin:     General: Skin is warm and dry.      Coloration: Skin is not pale.      Findings: No erythema.      Comments: No venous staining   Neurological:      Mental Status: She is alert and oriented to person, place, and time.   Psychiatric:         Speech: Speech " normal.         Behavior: Behavior is cooperative.         Thought Content: Thought content normal.         Judgment: Judgment normal.         BUN 15 Creat 0.7    ASSESSMENT:  Diagnoses and all orders for this visit:    1. Bilateral carotid artery stenosis (Primary)  -     Duplex Carotid Ultrasound CAR; Future    2. Essential hypertension    3. Primary osteoarthritis involving multiple joints    4. Mixed hyperlipidemia      PLAN:  Detailed discussion with Neal Roblero regarding situation and options.  Moderate carotid stenosis, asymptomatic..  Multiple risk factors with severe comorbidities.  No intervention indicated at this time.  Will follow with interval imaging.  Risks, benefits discussed.  Understands and wishes to proceed with plan.    Return in 6 months with carotid duplex    Return after above studies complete  Recommended regular physical activity, progressive walking program.  Continue current medications as directed.  Will Obtain relevant old records.  Advance Care Planning   ACP discussion was declined by the patient. Patient does not have an advance directive, declines further assistance.    Thank you for the opportunity to participate in this patient's care.    Copy to primary care provider.

## 2022-03-28 ENCOUNTER — OFFICE VISIT (OUTPATIENT)
Dept: CARDIAC SURGERY | Facility: CLINIC | Age: 77
End: 2022-03-28

## 2022-03-28 DIAGNOSIS — I10 PRIMARY HYPERTENSION: ICD-10-CM

## 2022-03-28 DIAGNOSIS — I65.23 BILATERAL CAROTID ARTERY STENOSIS: Primary | ICD-10-CM

## 2022-03-28 DIAGNOSIS — M15.9 PRIMARY OSTEOARTHRITIS INVOLVING MULTIPLE JOINTS: ICD-10-CM

## 2022-03-28 PROCEDURE — 99214 OFFICE O/P EST MOD 30 MIN: CPT | Performed by: THORACIC SURGERY (CARDIOTHORACIC VASCULAR SURGERY)

## 2022-03-28 RX ORDER — MULTIPLE VITAMINS W/ MINERALS TAB 9MG-400MCG
TAB ORAL
COMMUNITY

## 2022-03-28 RX ORDER — PREDNISONE 1 MG/1
10 TABLET ORAL DAILY
COMMUNITY
Start: 2021-07-13 | End: 2022-07-14

## 2022-03-28 RX ORDER — AMLODIPINE BESYLATE 5 MG/1
TABLET ORAL
COMMUNITY

## 2022-05-29 NOTE — PROGRESS NOTES
3/28/2022    Neal Roblero  1945    Chief Complaint:    Chief Complaint   Patient presents with   • Carotid Artery Disease       HPI:      PCP:  Shon Martini MD     76 y.o. female with HTN(stable, increased risk stroke, rupture) and Hyperlipidemia(stable, increased risk cardiovascular events) , DJD(stable), carotid stenosis(new, increase risk stroke).  never smoked.  Asymptomatic carotid stenosis.  No TIA stroke amaurosis.  No MI claudication. No other associated signs, symptoms or modifying factors.     1/2021 Carotid Duplex:  DEJAH 50-69% (191/50cm/s, ratio 3.4), LICA 0-49% (no velocity data).  Antegrade verts.  3/2022 Carotid Duplex:  DEJAH 0-49% (88/38cm/s, ratio 3.4), LICA 0-49% (69/23cm/s).  Antegrade verts.    The following portions of the patient's history were reviewed and updated as appropriate: allergies, current medications, past family history, past medical history, past social history, past surgical history and problem list.  Recent images independently reviewed.  Available laboratory values reviewed.    PMH:  Past Medical History:   Diagnosis Date   • Allergic    • Anemia    • Arthritis     rheumatoid arthritis   • Cataract    • Hypertension    • Pre-diabetes      Past Surgical History:   Procedure Laterality Date   • TONSILLECTOMY  1975     No family history on file.  Social History     Tobacco Use   • Smoking status: Never Smoker   • Smokeless tobacco: Never Used   Vaping Use   • Vaping Use: Never used   Substance Use Topics   • Alcohol use: No   • Drug use: Never       ALLERGIES:  Allergies   Allergen Reactions   • Dilaudid [Hydromorphone] Itching and Swelling   • Latex Swelling   • Penicillins Swelling         MEDICATIONS:    Current Outpatient Medications:   •  amLODIPine (NORVASC) 5 MG tablet, amlodipine 5 mg tablet  TAKE 1 TABLET BY MOUTH ONCE DAILY IN THE MORNING, Disp: , Rfl:   •  celecoxib (CeleBREX) 200 MG capsule, Take 200 mg by mouth Daily., Disp: , Rfl:   •  leflunomide (ARAVA)  10 MG tablet, Take 10 mg by mouth Daily., Disp: , Rfl:   •  multivitamin with minerals tablet tablet, Centrum Silver  500 mg BID, Disp: , Rfl:   •  Omega-3 Fatty Acids (fish oil) 1000 MG capsule capsule, Take  by mouth 4 (Four) Times a Day., Disp: , Rfl:   •  predniSONE (DELTASONE) 5 MG tablet, Take 10 mg by mouth Daily., Disp: , Rfl:     Review of Systems   Review of Systems   Constitutional: Positive for malaise/fatigue. Negative for weight loss.   Cardiovascular: Negative for chest pain, claudication and dyspnea on exertion.   Respiratory: Negative for cough and shortness of breath.    Skin: Negative for color change and poor wound healing.   Neurological: Negative for dizziness, numbness and weakness.   Psychiatric/Behavioral: Positive for depression.       Physical Exam   There were no vitals filed for this visit.  There is no height or weight on file to calculate BSA.  There is no height or weight on file to calculate BMI.  Physical Exam  Constitutional:       General: She is not in acute distress.     Appearance: She is not ill-appearing.   HENT:      Right Ear: Hearing normal.      Left Ear: Hearing normal.      Nose: No nasal deformity.      Mouth/Throat:      Dentition: Normal dentition. Does not have dentures.   Cardiovascular:      Rate and Rhythm: Normal rate and regular rhythm.      Pulses:           Carotid pulses are 2+ on the right side and 2+ on the left side.       Radial pulses are 2+ on the right side and 2+ on the left side.        Posterior tibial pulses are 2+ on the right side and 2+ on the left side.      Heart sounds: No murmur heard.  Pulmonary:      Effort: Pulmonary effort is normal.      Breath sounds: Normal breath sounds.   Abdominal:      General: There is no distension.      Palpations: Abdomen is soft. There is no mass.      Tenderness: There is no abdominal tenderness.   Musculoskeletal:         General: No deformity.      Comments: Gait normal.    Skin:     General: Skin is warm  and dry.      Coloration: Skin is not pale.      Findings: No erythema.      Comments: No venous staining   Neurological:      Mental Status: She is alert and oriented to person, place, and time.   Psychiatric:         Speech: Speech normal.         Behavior: Behavior is cooperative.         Thought Content: Thought content normal.         Judgment: Judgment normal.       BUN 15 Creat 0.7    ASSESSMENT:  Diagnoses and all orders for this visit:    1. Bilateral carotid artery stenosis (Primary)  -     Duplex Carotid Ultrasound CAR; Future    2. Primary hypertension    3. Primary osteoarthritis involving multiple joints    PLAN:  Detailed discussion with Neal Roblero regarding situation and options.  Stable carotid stenosis, asymptomatic..  Multiple risk factors with severe comorbidities.  No intervention indicated at this time.  Will follow with interval imaging.  Risks, benefits discussed.  Understands and wishes to proceed with plan.    Return in 1 year with carotid duplex    Return after above studies complete  Recommended regular physical activity, progressive walking program.  Continue current medications as directed.  Advance Care Planning   ACP discussion was declined by the patient. Patient does not have an advance directive, declines further assistance.     Thank you for the opportunity to participate in this patient's care.    Copy to primary care provider.

## 2023-05-04 ENCOUNTER — OFFICE VISIT (OUTPATIENT)
Dept: CARDIAC SURGERY | Facility: CLINIC | Age: 78
End: 2023-05-04
Payer: MEDICARE

## 2023-05-04 VITALS
DIASTOLIC BLOOD PRESSURE: 72 MMHG | OXYGEN SATURATION: 98 % | TEMPERATURE: 98.6 F | SYSTOLIC BLOOD PRESSURE: 112 MMHG | HEART RATE: 86 BPM | BODY MASS INDEX: 21.34 KG/M2 | WEIGHT: 95.2 LBS

## 2023-05-04 DIAGNOSIS — I10 PRIMARY HYPERTENSION: ICD-10-CM

## 2023-05-04 DIAGNOSIS — I65.23 BILATERAL CAROTID ARTERY STENOSIS: Primary | ICD-10-CM

## 2023-05-04 PROCEDURE — 1159F MED LIST DOCD IN RCRD: CPT | Performed by: NURSE PRACTITIONER

## 2023-05-04 PROCEDURE — 3074F SYST BP LT 130 MM HG: CPT | Performed by: NURSE PRACTITIONER

## 2023-05-04 PROCEDURE — 99213 OFFICE O/P EST LOW 20 MIN: CPT | Performed by: NURSE PRACTITIONER

## 2023-05-04 PROCEDURE — 1160F RVW MEDS BY RX/DR IN RCRD: CPT | Performed by: NURSE PRACTITIONER

## 2023-05-04 PROCEDURE — 3078F DIAST BP <80 MM HG: CPT | Performed by: NURSE PRACTITIONER

## 2023-05-04 RX ORDER — LISINOPRIL 20 MG/1
20 TABLET ORAL DAILY
COMMUNITY

## 2023-05-04 NOTE — PROGRESS NOTES
3/28/2022    Neal Roblero  1945    Chief Complaint:    Chief Complaint   Patient presents with   • Carotid Artery Disease       HPI:      PCP:  Shon Martini MD     76 y.o. female with HTN(stable, increased risk stroke, rupture) and Hyperlipidemia(stable, increased risk cardiovascular events) , DJD(stable), carotid stenosis(new, increase risk stroke).  never smoked.  Asymptomatic carotid stenosis.  recent back surgery, deals with sciatic like pain particularly in right leg.  RA previously on prednisone.  No TIA stroke amaurosis.  No MI claudication. No other associated signs, symptoms or modifying factors.     1/2021 Carotid Duplex:  DEJAH 50-69% (191/50cm/s, ratio 3.4), LICA 0-49% (no velocity data).  Antegrade verts.  3/2022 Carotid Duplex:  DEJAH 0-49% (88/38cm/s, ratio 3.4), LICA 0-49% (69/23cm/s).  Antegrade verts.  4/2023 Carotid Duplex: DEJAH 0-49% mPSV 123c/s mEDV 32c/s Ratio 1.8, LICA 0-49% mPSV 93c/s mEDV 27c/s Ratio 1.1, Antegrade vertebrals      The following portions of the patient's history were reviewed and updated as appropriate: allergies, current medications, past family history, past medical history, past social history, past surgical history and problem list.  Recent images independently reviewed.  Available laboratory values reviewed.    PMH:  Past Medical History:   Diagnosis Date   • Allergic    • Anemia    • Arthritis     rheumatoid arthritis   • Cataract    • Hypertension    • Pre-diabetes      Past Surgical History:   Procedure Laterality Date   • TONSILLECTOMY  1975     History reviewed. No pertinent family history.  Social History     Tobacco Use   • Smoking status: Never     Passive exposure: Never   • Smokeless tobacco: Never   Vaping Use   • Vaping Use: Never used   Substance Use Topics   • Alcohol use: No   • Drug use: Never       ALLERGIES:  Allergies   Allergen Reactions   • Dilaudid [Hydromorphone] Itching and Swelling   • Latex Swelling   • Penicillins Swelling          MEDICATIONS:    Current Outpatient Medications:   •  amLODIPine (NORVASC) 10 MG tablet, Take 1 tablet by mouth Daily., Disp: , Rfl:   •  celecoxib (CeleBREX) 200 MG capsule, Take 1 capsule by mouth Daily., Disp: , Rfl:   •  leflunomide (ARAVA) 10 MG tablet, Take 1 tablet by mouth Daily., Disp: , Rfl:   •  lisinopril (PRINIVIL,ZESTRIL) 20 MG tablet, Take 1 tablet by mouth Daily., Disp: , Rfl:   •  multivitamin with minerals tablet tablet, Centrum Silver  500 mg BID, Disp: , Rfl:   •  Omega-3 Fatty Acids (fish oil) 1000 MG capsule capsule, Take  by mouth 4 (Four) Times a Day., Disp: , Rfl:     Review of Systems   Review of Systems   Constitutional: Positive for malaise/fatigue. Negative for weight loss.   Cardiovascular: Negative for chest pain, claudication and dyspnea on exertion.   Respiratory: Negative for cough and shortness of breath.    Skin: Negative for color change and poor wound healing.   Musculoskeletal: Positive for arthritis, back pain, joint pain and muscle weakness.   Neurological: Negative for dizziness, numbness and weakness.        No amaurosis fugax, TIA, or CVA.         Physical Exam   Vitals:    05/04/23 1352   BP: 112/72   BP Location: Left arm   Pulse: 86   Temp: 98.6 °F (37 °C)   TempSrc: Infrared   SpO2: 98%   Weight: 43.2 kg (95 lb 3.2 oz)     Body surface area is 1.3 meters squared.  Body mass index is 21.34 kg/m².  Physical Exam  Constitutional:       General: She is not in acute distress.     Appearance: She is not ill-appearing.   HENT:      Right Ear: Hearing normal.      Left Ear: Hearing normal.      Nose: No nasal deformity.      Mouth/Throat:      Dentition: Normal dentition. Does not have dentures.   Cardiovascular:      Rate and Rhythm: Normal rate and regular rhythm.      Pulses:           Carotid pulses are 2+ on the right side and 2+ on the left side.       Radial pulses are 2+ on the right side and 2+ on the left side.        Posterior tibial pulses are 2+ on the  right side and 2+ on the left side.      Heart sounds: No murmur heard.     Comments: Bilateral carotid bruit present  Pulmonary:      Effort: Pulmonary effort is normal.      Breath sounds: Normal breath sounds.   Abdominal:      General: There is no distension.      Palpations: Abdomen is soft. There is no mass.      Tenderness: There is no abdominal tenderness.   Musculoskeletal:         General: No deformity.      Comments: Gait normal.    Skin:     General: Skin is warm and dry.      Coloration: Skin is not pale.      Findings: No erythema.      Comments: No venous staining   Neurological:      Mental Status: She is alert and oriented to person, place, and time.   Psychiatric:         Speech: Speech normal.         Behavior: Behavior is cooperative.         Thought Content: Thought content normal.         Judgment: Judgment normal.       BUN 15 Creat 0.7    Assessment/Plan    1. Bilateral carotid artery stenosis  Mild bilateral asymptomatic disease.    Velocities remain stable.    Aspirin, undergoing statin therapy with PCP    No surgical intervention indicated at this time follow-up with interval imaging repeat duplex in 1 year    - Duplex Carotid Ultrasound CAR; Future    2. Primary hypertension  Controlled amlodipine, ACE  Follow-up with PCP as directed      Return in 1 year with carotid duplex    Return after above studies complete  Recommended regular physical activity, progressive walking program.  Continue current medications as directed.  Advance Care Planning   ACP discussion was declined by the patient. Patient does not have an advance directive, declines further assistance.     Thank you for the opportunity to participate in this patient's care.        This document has been electronically signed by MARVIN Whiting-BC @  On May 4, 2023 14:17 CDT